# Patient Record
Sex: FEMALE | Race: WHITE
[De-identification: names, ages, dates, MRNs, and addresses within clinical notes are randomized per-mention and may not be internally consistent; named-entity substitution may affect disease eponyms.]

---

## 2017-01-18 ENCOUNTER — HOSPITAL ENCOUNTER (EMERGENCY)
Dept: HOSPITAL 19 - COL.ER | Age: 27
Discharge: HOME | End: 2017-01-18
Payer: MEDICARE

## 2017-01-18 VITALS — WEIGHT: 155.43 LBS | HEIGHT: 64.02 IN | BODY MASS INDEX: 26.53 KG/M2

## 2017-01-18 VITALS — SYSTOLIC BLOOD PRESSURE: 114 MMHG | TEMPERATURE: 97.6 F | HEART RATE: 90 BPM | DIASTOLIC BLOOD PRESSURE: 77 MMHG

## 2017-01-18 DIAGNOSIS — M54.89: ICD-10-CM
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
## 2017-01-18 DIAGNOSIS — S06.0X9A: Primary | ICD-10-CM
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
## 2017-01-18 DIAGNOSIS — M54.2: ICD-10-CM

## 2017-01-18 LAB
ADJUSTED CALCIUM: 9 MG/DL (ref 8.4–10.2)
ALBUMIN SERPL-MCNC: 4 GM/DL (ref 3.5–5)
ALP SERPL-CCNC: 59 U/L (ref 50–136)
ALT SERPL-CCNC: 25 U/L (ref 9–52)
ANION GAP SERPL CALC-SCNC: 9 MMOL/L (ref 7–16)
BASOPHILS # BLD: 0.1 10*3/UL (ref 0–0.2)
BASOPHILS NFR BLD AUTO: 0.7 % (ref 0–2)
BILIRUB SERPL-MCNC: 0.5 MG/DL (ref 0–1)
BUN SERPL-MCNC: 19 MG/DL (ref 7–17)
CALCIUM SERPL-MCNC: 9 MG/DL (ref 8.4–10.2)
CHLORIDE SERPL-SCNC: 108 MMOL/L (ref 98–107)
CO2 SERPL-SCNC: 25 MMOL/L (ref 22–30)
CREAT SERPL-SCNC: 0.73 MG/DL (ref 0.52–1.25)
EOSINOPHIL # BLD: 0.3 10*3/UL (ref 0–0.7)
EOSINOPHIL NFR BLD: 3.9 % (ref 0–4)
ERYTHROCYTE [DISTWIDTH] IN BLOOD BY AUTOMATED COUNT: 12.9 % (ref 11.5–14.5)
GLUCOSE SERPL-MCNC: 81 MG/DL (ref 74–106)
GRANULOCYTES # BLD AUTO: 47.6 % (ref 42.2–75.2)
HCT VFR BLD AUTO: 35.8 % (ref 37–47)
HGB BLD-MCNC: 11.9 G/DL (ref 12.5–16)
LIPASE SERPL-CCNC: 85 U/L (ref 23–300)
LYMPHOCYTES # BLD: 2.9 10*3/UL (ref 1.2–3.4)
LYMPHOCYTES NFR BLD: 38.9 % (ref 20–51)
MCH RBC QN AUTO: 33 PG (ref 27–31)
MCHC RBC AUTO-ENTMCNC: 33 G/DL (ref 33–37)
MCV RBC AUTO: 98 FL (ref 80–100)
MONOCYTES # BLD: 0.7 10*3/UL (ref 0.1–0.6)
MONOCYTES NFR BLD AUTO: 8.9 % (ref 1.7–9.3)
NEUTROPHILS # BLD: 3.5 10*3/UL (ref 1.4–6.5)
PLATELET # BLD AUTO: 277 K/MM3 (ref 130–400)
PMV BLD AUTO: 10 FL (ref 7.4–10.4)
POTASSIUM SERPL-SCNC: 4.5 MMOL/L (ref 3.4–5)
PROT SERPL-MCNC: 7.1 GM/DL (ref 6.4–8.2)
RBC # BLD AUTO: 3.66 M/MM3 (ref 4.1–5.3)
SODIUM SERPL-SCNC: 141 MMOL/L (ref 137–145)
WBC # BLD AUTO: 7.4 K/MM3 (ref 4.8–10.8)

## 2017-01-19 ENCOUNTER — HOSPITAL ENCOUNTER (EMERGENCY)
Dept: HOSPITAL 6 - ED | Age: 27
Discharge: LEFT BEFORE DISCHARGE | End: 2017-01-19
Payer: MEDICARE

## 2017-01-19 VITALS — SYSTOLIC BLOOD PRESSURE: 148 MMHG | DIASTOLIC BLOOD PRESSURE: 91 MMHG

## 2017-01-19 DIAGNOSIS — R52: Primary | ICD-10-CM

## 2017-01-19 DIAGNOSIS — Z91.81: ICD-10-CM

## 2017-01-22 ENCOUNTER — HOSPITAL ENCOUNTER (EMERGENCY)
Dept: HOSPITAL 6 - ED | Age: 27
Discharge: HOME | End: 2017-01-22
Payer: MEDICARE

## 2017-01-22 VITALS — SYSTOLIC BLOOD PRESSURE: 124 MMHG | DIASTOLIC BLOOD PRESSURE: 71 MMHG

## 2017-01-22 DIAGNOSIS — R29.810: Primary | ICD-10-CM

## 2017-01-22 DIAGNOSIS — M25.511: ICD-10-CM

## 2017-01-22 DIAGNOSIS — F41.9: ICD-10-CM

## 2017-01-22 DIAGNOSIS — F17.210: ICD-10-CM

## 2017-01-31 ENCOUNTER — HOSPITAL ENCOUNTER (EMERGENCY)
Dept: HOSPITAL 6 - ED | Age: 27
Discharge: LEFT BEFORE DISCHARGE | End: 2017-01-31
Payer: MEDICARE

## 2017-01-31 ENCOUNTER — HOSPITAL ENCOUNTER (EMERGENCY)
Dept: HOSPITAL 19 - COL.ER | Age: 27
Discharge: LEFT BEFORE DISCHARGE | End: 2017-01-31
Payer: MEDICARE

## 2017-01-31 VITALS — SYSTOLIC BLOOD PRESSURE: 111 MMHG | DIASTOLIC BLOOD PRESSURE: 75 MMHG

## 2017-01-31 VITALS — WEIGHT: 154.32 LBS | HEIGHT: 64.02 IN | BODY MASS INDEX: 26.35 KG/M2

## 2017-01-31 VITALS — DIASTOLIC BLOOD PRESSURE: 76 MMHG | TEMPERATURE: 98.2 F | HEART RATE: 82 BPM | SYSTOLIC BLOOD PRESSURE: 119 MMHG

## 2017-01-31 DIAGNOSIS — F41.9: ICD-10-CM

## 2017-01-31 DIAGNOSIS — R07.89: ICD-10-CM

## 2017-01-31 DIAGNOSIS — Z53.9: Primary | ICD-10-CM

## 2017-01-31 DIAGNOSIS — R06.02: ICD-10-CM

## 2017-01-31 DIAGNOSIS — R04.2: Primary | ICD-10-CM

## 2017-01-31 DIAGNOSIS — R05: ICD-10-CM

## 2017-01-31 DIAGNOSIS — Z91.5: ICD-10-CM

## 2017-01-31 DIAGNOSIS — F17.210: ICD-10-CM

## 2017-02-22 ENCOUNTER — HOSPITAL ENCOUNTER (EMERGENCY)
Dept: HOSPITAL 19 - COL.ER | Age: 27
Discharge: HOME | End: 2017-02-22
Payer: MEDICARE

## 2017-02-22 VITALS — WEIGHT: 149.25 LBS | HEIGHT: 64.02 IN | BODY MASS INDEX: 25.48 KG/M2

## 2017-02-22 VITALS — DIASTOLIC BLOOD PRESSURE: 87 MMHG | TEMPERATURE: 98.1 F | HEART RATE: 93 BPM | SYSTOLIC BLOOD PRESSURE: 129 MMHG

## 2017-02-22 DIAGNOSIS — H10.211: ICD-10-CM

## 2017-02-22 DIAGNOSIS — T52.0X1A: Primary | ICD-10-CM

## 2017-03-07 ENCOUNTER — HOSPITAL ENCOUNTER (EMERGENCY)
Dept: HOSPITAL 19 - COL.ER | Age: 27
Discharge: HOME | End: 2017-03-07
Payer: MEDICARE

## 2017-03-07 VITALS — HEART RATE: 92 BPM | SYSTOLIC BLOOD PRESSURE: 117 MMHG | DIASTOLIC BLOOD PRESSURE: 74 MMHG | TEMPERATURE: 98.1 F

## 2017-03-07 VITALS — WEIGHT: 140.21 LBS | BODY MASS INDEX: 23.94 KG/M2 | HEIGHT: 64.02 IN

## 2017-03-07 DIAGNOSIS — J45.909: ICD-10-CM

## 2017-03-07 DIAGNOSIS — F17.210: ICD-10-CM

## 2017-03-07 DIAGNOSIS — J20.9: Primary | ICD-10-CM

## 2017-04-13 ENCOUNTER — HOSPITAL ENCOUNTER (EMERGENCY)
Dept: HOSPITAL 19 - COL.ER | Age: 27
Discharge: HOME | End: 2017-04-13
Payer: MEDICARE

## 2017-04-13 VITALS — SYSTOLIC BLOOD PRESSURE: 117 MMHG | DIASTOLIC BLOOD PRESSURE: 66 MMHG | HEART RATE: 73 BPM

## 2017-04-13 VITALS — WEIGHT: 150.36 LBS | BODY MASS INDEX: 25.67 KG/M2 | HEIGHT: 64.02 IN

## 2017-04-13 VITALS — TEMPERATURE: 99.1 F

## 2017-04-13 DIAGNOSIS — A08.4: Primary | ICD-10-CM

## 2017-04-13 LAB
ADJUSTED CALCIUM: 9.6 MG/DL (ref 8.4–10.2)
ALBUMIN SERPL-MCNC: 4.4 GM/DL (ref 3.5–5)
ALP SERPL-CCNC: 65 U/L (ref 50–136)
ALT SERPL-CCNC: 15 U/L (ref 9–52)
ANION GAP SERPL CALC-SCNC: 13 MMOL/L (ref 7–16)
BASOPHILS # BLD: 0.1 10*3/UL (ref 0–0.2)
BASOPHILS NFR BLD AUTO: 0.7 % (ref 0–2)
BILIRUB SERPL-MCNC: 0.7 MG/DL (ref 0–1)
BUN SERPL-MCNC: 10 MG/DL (ref 7–17)
CALCIUM SERPL-MCNC: 9.9 MG/DL (ref 8.4–10.2)
CHLORIDE SERPL-SCNC: 103 MMOL/L (ref 98–107)
CO2 SERPL-SCNC: 23 MMOL/L (ref 22–30)
CREAT SERPL-SCNC: 0.63 MG/DL (ref 0.52–1.25)
EOSINOPHIL # BLD: 0.1 10*3/UL (ref 0–0.7)
EOSINOPHIL NFR BLD: 1.6 % (ref 0–4)
ERYTHROCYTE [DISTWIDTH] IN BLOOD BY AUTOMATED COUNT: 12.8 % (ref 11.5–14.5)
GLUCOSE SERPL-MCNC: 99 MG/DL (ref 74–106)
GRANULOCYTES # BLD AUTO: 60.7 % (ref 42.2–75.2)
HCT VFR BLD AUTO: 38.3 % (ref 37–47)
HGB BLD-MCNC: 12.9 G/DL (ref 12.5–16)
HYALINE CASTS #/AREA URNS LPF: (no result) /LPF
KETONES UR STRIP.AUTO-MCNC: (no result) MG/DL
LIPASE SERPL-CCNC: 31 U/L (ref 23–300)
LYMPHOCYTES # BLD: 2.3 10*3/UL (ref 1.2–3.4)
LYMPHOCYTES NFR BLD: 30.6 % (ref 20–51)
MCH RBC QN AUTO: 31 PG (ref 27–31)
MCHC RBC AUTO-ENTMCNC: 34 G/DL (ref 33–37)
MCV RBC AUTO: 93 FL (ref 80–100)
MONOCYTES # BLD: 0.5 10*3/UL (ref 0.1–0.6)
MONOCYTES NFR BLD AUTO: 6.3 % (ref 1.7–9.3)
NEUTROPHILS # BLD: 4.5 10*3/UL (ref 1.4–6.5)
PH UR STRIP.AUTO: 6 [PH] (ref 5–8)
PLATELET # BLD AUTO: 240 K/MM3 (ref 130–400)
PMV BLD AUTO: 11.3 FL (ref 7.4–10.4)
POTASSIUM SERPL-SCNC: 3.7 MMOL/L (ref 3.4–5)
PROT SERPL-MCNC: 7.4 GM/DL (ref 6.4–8.2)
RBC # BLD AUTO: 4.13 M/MM3 (ref 4.1–5.3)
RBC # UR: (no result) /HPF
SODIUM SERPL-SCNC: 139 MMOL/L (ref 137–145)
SP GR UR STRIP.AUTO: 1.02 (ref 1–1.03)
SQUAMOUS # URNS: (no result) /HPF
WBC # BLD AUTO: 7.4 K/MM3 (ref 4.8–10.8)
WBC #/AREA URNS HPF: (no result) /HPF

## 2017-10-05 ENCOUNTER — HOSPITAL ENCOUNTER (INPATIENT)
Dept: HOSPITAL 19 - COL.ER | Age: 27
LOS: 1 days | Discharge: ANOTHER HOSPITAL | DRG: 917 | End: 2017-10-06
Attending: INTERNAL MEDICINE | Admitting: INTERNAL MEDICINE
Payer: MEDICARE

## 2017-10-05 VITALS — OXYGEN SATURATION: 100 %

## 2017-10-05 VITALS — OXYGEN SATURATION: 96 %

## 2017-10-05 VITALS — OXYGEN SATURATION: 95 %

## 2017-10-05 VITALS
HEART RATE: 48 BPM | DIASTOLIC BLOOD PRESSURE: 67 MMHG | SYSTOLIC BLOOD PRESSURE: 105 MMHG | OXYGEN SATURATION: 100 % | TEMPERATURE: 97.6 F

## 2017-10-05 VITALS — OXYGEN SATURATION: 98 %

## 2017-10-05 VITALS — OXYGEN SATURATION: 99 %

## 2017-10-05 VITALS — OXYGEN SATURATION: 97 %

## 2017-10-05 VITALS — OXYGEN SATURATION: 94 %

## 2017-10-05 VITALS
DIASTOLIC BLOOD PRESSURE: 67 MMHG | OXYGEN SATURATION: 98 % | TEMPERATURE: 97.6 F | SYSTOLIC BLOOD PRESSURE: 112 MMHG | HEART RATE: 91 BPM

## 2017-10-05 VITALS
TEMPERATURE: 97.8 F | OXYGEN SATURATION: 100 % | DIASTOLIC BLOOD PRESSURE: 53 MMHG | SYSTOLIC BLOOD PRESSURE: 100 MMHG | HEART RATE: 58 BPM

## 2017-10-05 VITALS
SYSTOLIC BLOOD PRESSURE: 90 MMHG | HEART RATE: 77 BPM | OXYGEN SATURATION: 100 % | TEMPERATURE: 98.7 F | DIASTOLIC BLOOD PRESSURE: 76 MMHG

## 2017-10-05 VITALS — OXYGEN SATURATION: 92 %

## 2017-10-05 VITALS — OXYGEN SATURATION: 84 %

## 2017-10-05 VITALS — OXYGEN SATURATION: 88 %

## 2017-10-05 VITALS — OXYGEN SATURATION: 93 %

## 2017-10-05 VITALS — OXYGEN SATURATION: 66 %

## 2017-10-05 VITALS — BODY MASS INDEX: 23.46 KG/M2 | WEIGHT: 154.76 LBS | HEIGHT: 67.99 IN

## 2017-10-05 VITALS — OXYGEN SATURATION: 78 %

## 2017-10-05 DIAGNOSIS — F32.9: ICD-10-CM

## 2017-10-05 DIAGNOSIS — F11.10: ICD-10-CM

## 2017-10-05 DIAGNOSIS — G40.89: ICD-10-CM

## 2017-10-05 DIAGNOSIS — F17.210: ICD-10-CM

## 2017-10-05 DIAGNOSIS — T42.8X2A: Primary | ICD-10-CM

## 2017-10-05 DIAGNOSIS — F13.10: ICD-10-CM

## 2017-10-05 DIAGNOSIS — F41.1: ICD-10-CM

## 2017-10-05 DIAGNOSIS — J96.01: ICD-10-CM

## 2017-10-05 LAB
ABG VENTILATOR TIDAL VOLUME: 420 ML
ADJUSTED CALCIUM: 8.8 MG/DL (ref 8.4–10.2)
ADJUSTED CALCIUM: 9 MG/DL (ref 8.4–10.2)
ADJUSTED CALCIUM: 9.3 MG/DL (ref 8.4–10.2)
ALBUMIN SERPL-MCNC: 3.1 GM/DL (ref 3.5–5)
ALBUMIN SERPL-MCNC: 3.4 GM/DL (ref 3.5–5)
ALBUMIN SERPL-MCNC: 4 GM/DL (ref 3.5–5)
ALP SERPL-CCNC: 50 U/L (ref 50–136)
ALP SERPL-CCNC: 54 U/L (ref 50–136)
ALP SERPL-CCNC: 70 U/L (ref 50–136)
ALT SERPL-CCNC: 28 U/L (ref 9–52)
ALT SERPL-CCNC: 31 U/L (ref 9–52)
ALT SERPL-CCNC: 33 U/L (ref 9–52)
AMPHET UR-MCNC: NEGATIVE NG/ML
ANION GAP SERPL CALC-SCNC: 10 MMOL/L (ref 7–16)
ANION GAP SERPL CALC-SCNC: 4 MMOL/L (ref 7–16)
ANION GAP SERPL CALC-SCNC: 7 MMOL/L (ref 7–16)
APAP SERPL-MCNC: < 10 UG/ML (ref 10–30)
APTT PPP: 31.8 SECONDS (ref 26–37)
APTT PPP: 31.9 SECONDS (ref 26–37)
ARTERIAL PATENCY WRIST A: (no result)
ARTERIAL PATENCY WRIST A: YES
ATS?: YES
ATS?: YES
BARBITURATES UR SCN-MCNC: NEGATIVE NG/ML
BASE EXCESS BLDA CALC-SCNC: 0.3 MMOL/L (ref -2–2)
BASE EXCESS BLDA CALC-SCNC: 2.4 MMOL/L (ref -2–2)
BASOPHILS # BLD: 0 10*3/UL (ref 0–0.2)
BASOPHILS NFR BLD AUTO: 0.5 % (ref 0–2)
BASOPHILS NFR BLD AUTO: 0.5 % (ref 0–2)
BASOPHILS NFR BLD AUTO: 0.7 % (ref 0–2)
BENZODIAZ UR-MCNC: NEGATIVE NG/ML
BILIRUB SERPL-MCNC: 0.3 MG/DL (ref 0–1)
BILIRUB SERPL-MCNC: 0.3 MG/DL (ref 0–1)
BILIRUB SERPL-MCNC: 0.5 MG/DL (ref 0–1)
BUN SERPL-MCNC: 6 MG/DL (ref 7–17)
BUN SERPL-MCNC: 7 MG/DL (ref 7–17)
BUN SERPL-MCNC: 8 MG/DL (ref 7–17)
BUPRENORPHINE UR-MCNC: NEGATIVE NG/ML
CALCIUM SERPL-MCNC: 8.1 MG/DL (ref 8.4–10.2)
CALCIUM SERPL-MCNC: 8.5 MG/DL (ref 8.4–10.2)
CALCIUM SERPL-MCNC: 9.3 MG/DL (ref 8.4–10.2)
CHLORIDE SERPL-SCNC: 108 MMOL/L (ref 98–107)
CHLORIDE SERPL-SCNC: 111 MMOL/L (ref 98–107)
CHLORIDE SERPL-SCNC: 112 MMOL/L (ref 98–107)
CK SERPL-CCNC: 45 U/L (ref 30–135)
CK SERPL-CCNC: 54 U/L (ref 30–135)
CO2 BLDA-SCNC: 26.1 MMOL/L
CO2 BLDA-SCNC: 28.1 MMOL/L
CO2 SERPL-SCNC: 25 MMOL/L (ref 22–30)
CO2 SERPL-SCNC: 26 MMOL/L (ref 22–30)
CO2 SERPL-SCNC: 26 MMOL/L (ref 22–30)
CREAT SERPL-SCNC: 0.57 MG/DL (ref 0.52–1.25)
CREAT SERPL-SCNC: 0.57 MG/DL (ref 0.52–1.25)
CREAT SERPL-SCNC: 0.64 MG/DL (ref 0.52–1.25)
DIGOXIN SERPL-MCNC: 26.5 NG/ML (ref 3–18.6)
EOSINOPHIL # BLD: 0.2 10*3/UL (ref 0–0.7)
EOSINOPHIL # BLD: 0.2 10*3/UL (ref 0–0.7)
EOSINOPHIL # BLD: 0.3 10*3/UL (ref 0–0.7)
EOSINOPHIL NFR BLD: 2.5 % (ref 0–4)
EOSINOPHIL NFR BLD: 3.8 % (ref 0–4)
EOSINOPHIL NFR BLD: 4.1 % (ref 0–4)
ERYTHROCYTE [DISTWIDTH] IN BLOOD BY AUTOMATED COUNT: 12.9 % (ref 11.5–14.5)
ERYTHROCYTE [DISTWIDTH] IN BLOOD BY AUTOMATED COUNT: 13.2 % (ref 11.5–14.5)
ERYTHROCYTE [DISTWIDTH] IN BLOOD BY AUTOMATED COUNT: 13.2 % (ref 11.5–14.5)
GLUCOSE SERPL-MCNC: 137 MG/DL (ref 74–106)
GLUCOSE SERPL-MCNC: 79 MG/DL (ref 74–106)
GLUCOSE SERPL-MCNC: 89 MG/DL (ref 74–106)
GRANULOCYTES # BLD AUTO: 42.2 % (ref 42.2–75.2)
GRANULOCYTES # BLD AUTO: 53.7 % (ref 42.2–75.2)
GRANULOCYTES # BLD AUTO: 62.4 % (ref 42.2–75.2)
HCO3 BLDA-SCNC: 24.9 MEQ/L (ref 22–26)
HCO3 BLDA-SCNC: 26.8 MEQ/L (ref 22–26)
HCT VFR BLD AUTO: 31.6 % (ref 37–47)
HCT VFR BLD AUTO: 33.3 % (ref 37–47)
HCT VFR BLD AUTO: 35.8 % (ref 37–47)
HGB BLD-MCNC: 10.4 G/DL (ref 12.5–16)
HGB BLD-MCNC: 11 G/DL (ref 12.5–16)
HGB BLD-MCNC: 12 G/DL (ref 12.5–16)
INHALED O2 CONCENTRATION: 21 %
INHALED O2 CONCENTRATION: 60 %
INR BLD: 1.1 (ref 0.8–3)
INR BLD: 1.1 (ref 0.8–3)
LIPASE SERPL-CCNC: 66 U/L (ref 23–300)
LYMPHOCYTES # BLD: 2.4 10*3/UL (ref 1.2–3.4)
LYMPHOCYTES # BLD: 2.4 10*3/UL (ref 1.2–3.4)
LYMPHOCYTES # BLD: 2.7 10*3/UL (ref 1.2–3.4)
LYMPHOCYTES NFR BLD: 27.5 % (ref 20–51)
LYMPHOCYTES NFR BLD: 33 % (ref 20–51)
LYMPHOCYTES NFR BLD: 44.7 % (ref 20–51)
MAGNESIUM SERPL-MCNC: 1.8 MG/DL (ref 1.6–2.3)
MAGNESIUM SERPL-MCNC: 1.9 MG/DL (ref 1.6–2.3)
MCH RBC QN AUTO: 33 PG (ref 27–31)
MCHC RBC AUTO-ENTMCNC: 33 G/DL (ref 33–37)
MCHC RBC AUTO-ENTMCNC: 33 G/DL (ref 33–37)
MCHC RBC AUTO-ENTMCNC: 34 G/DL (ref 33–37)
MCV RBC AUTO: 101 FL (ref 80–100)
MCV RBC AUTO: 101 FL (ref 80–100)
MCV RBC AUTO: 99 FL (ref 80–100)
METHADONE UR-MCNC: NEGATIVE NG/ML
MONOCYTES # BLD: 0.4 10*3/UL (ref 0.1–0.6)
MONOCYTES # BLD: 0.6 10*3/UL (ref 0.1–0.6)
MONOCYTES # BLD: 0.7 10*3/UL (ref 0.1–0.6)
MONOCYTES NFR BLD AUTO: 7 % (ref 1.7–9.3)
MONOCYTES NFR BLD AUTO: 8.1 % (ref 1.7–9.3)
MONOCYTES NFR BLD AUTO: 8.6 % (ref 1.7–9.3)
NEUTROPHILS # BLD: 2.3 10*3/UL (ref 1.4–6.5)
NEUTROPHILS # BLD: 4.4 10*3/UL (ref 1.4–6.5)
NEUTROPHILS # BLD: 5.5 10*3/UL (ref 1.4–6.5)
OPIATES UR-MCNC: POSITIVE NG/ML
OXYCODONE UR CFM-MCNC: NEGATIVE NG/ML
OXYHGB MFR BLD: 94.2 %
OXYHGB MFR BLD: 96.9 %
PCP UR-MCNC: NEGATIVE NG/ML
PH UR STRIP.AUTO: 6 [PH] (ref 5–8)
PHOSPHATE SERPL-MCNC: 2.7 MG/DL (ref 2.5–4.5)
PHOSPHATE SERPL-MCNC: 2.7 MG/DL (ref 2.5–4.5)
PLATELET # BLD AUTO: 222 K/MM3 (ref 130–400)
PLATELET # BLD AUTO: 228 K/MM3 (ref 130–400)
PLATELET # BLD AUTO: 263 K/MM3 (ref 130–400)
PMV BLD AUTO: 10.5 FL (ref 7.4–10.4)
PMV BLD AUTO: 10.6 FL (ref 7.4–10.4)
PMV BLD AUTO: 10.6 FL (ref 7.4–10.4)
PO2 BLDA: 149.8 MMHG (ref 80–100)
PO2 BLDA: 79.1 MMHG (ref 80–100)
POTASSIUM SERPL-SCNC: 3.6 MMOL/L (ref 3.4–5)
POTASSIUM SERPL-SCNC: 3.7 MMOL/L (ref 3.4–5)
POTASSIUM SERPL-SCNC: 4.2 MMOL/L (ref 3.4–5)
PROPOXYPH UR-MCNC: NEGATIVE NG/ML
PROT SERPL-MCNC: 5.7 GM/DL (ref 6.4–8.2)
PROT SERPL-MCNC: 6.1 GM/DL (ref 6.4–8.2)
PROT SERPL-MCNC: 7 GM/DL (ref 6.4–8.2)
PROTHROMBIN TIME: 11.7 SECONDS (ref 9.7–12.8)
PROTHROMBIN TIME: 11.8 SECONDS (ref 9.7–12.8)
RBC # BLD AUTO: 3.14 M/MM3 (ref 4.1–5.3)
RBC # BLD AUTO: 3.29 M/MM3 (ref 4.1–5.3)
RBC # BLD AUTO: 3.61 M/MM3 (ref 4.1–5.3)
RBC # UR STRIP.AUTO: (no result) /UL
RBC # UR: (no result) /HPF
SALICYLATES SERPL-MCNC: < 1 MG/DL
SAO2 % BLDA: 95.1 % (ref 92–100)
SAO2 % BLDA: 98.3 % (ref 92–100)
SODIUM SERPL-SCNC: 140 MMOL/L (ref 137–145)
SODIUM SERPL-SCNC: 143 MMOL/L (ref 137–145)
SODIUM SERPL-SCNC: 144 MMOL/L (ref 137–145)
SP GR UR STRIP.AUTO: 1 (ref 1–1.03)
SQUAMOUS # URNS: (no result) /HPF
THC CANNABINOIDS URINE: NEGATIVE NG/ML
TROPONIN I SERPL-MCNC: < 0.012 NG/ML (ref 0–0.03)
WBC # BLD AUTO: 5.4 K/MM3 (ref 4.8–10.8)
WBC # BLD AUTO: 8.3 K/MM3 (ref 4.8–10.8)
WBC # BLD AUTO: 8.8 K/MM3 (ref 4.8–10.8)
WBC #/AREA URNS HPF: (no result) /HPF

## 2017-10-05 PROCEDURE — C1894 INTRO/SHEATH, NON-LASER: HCPCS

## 2017-10-05 PROCEDURE — C1751 CATH, INF, PER/CENT/MIDLINE: HCPCS

## 2017-10-05 PROCEDURE — 5A1935Z RESPIRATORY VENTILATION, LESS THAN 24 CONSECUTIVE HOURS: ICD-10-PCS | Performed by: INTERNAL MEDICINE

## 2017-10-05 PROCEDURE — 0BH18EZ INSERTION OF ENDOTRACHEAL AIRWAY INTO TRACHEA, VIA NATURAL OR ARTIFICIAL OPENING ENDOSCOPIC: ICD-10-PCS | Performed by: INTERNAL MEDICINE

## 2017-10-06 VITALS — OXYGEN SATURATION: 98 %

## 2017-10-06 VITALS — OXYGEN SATURATION: 96 %

## 2017-10-06 VITALS — OXYGEN SATURATION: 99 %

## 2017-10-06 VITALS — OXYGEN SATURATION: 100 %

## 2017-10-06 VITALS — OXYGEN SATURATION: 97 %

## 2017-10-06 VITALS — OXYGEN SATURATION: 74 %

## 2017-10-06 VITALS
TEMPERATURE: 98 F | HEART RATE: 72 BPM | SYSTOLIC BLOOD PRESSURE: 125 MMHG | DIASTOLIC BLOOD PRESSURE: 81 MMHG | OXYGEN SATURATION: 99 %

## 2017-10-06 VITALS
DIASTOLIC BLOOD PRESSURE: 87 MMHG | SYSTOLIC BLOOD PRESSURE: 127 MMHG | TEMPERATURE: 98.2 F | HEART RATE: 62 BPM | OXYGEN SATURATION: 98 %

## 2017-10-06 VITALS
OXYGEN SATURATION: 96 % | TEMPERATURE: 97.1 F | HEART RATE: 75 BPM | DIASTOLIC BLOOD PRESSURE: 64 MMHG | SYSTOLIC BLOOD PRESSURE: 112 MMHG

## 2017-10-06 VITALS — SYSTOLIC BLOOD PRESSURE: 110 MMHG | DIASTOLIC BLOOD PRESSURE: 73 MMHG | HEART RATE: 110 BPM | TEMPERATURE: 98.1 F

## 2017-10-06 VITALS — OXYGEN SATURATION: 94 %

## 2017-10-06 VITALS — OXYGEN SATURATION: 71 %

## 2017-10-06 VITALS — OXYGEN SATURATION: 96 % | HEART RATE: 63 BPM | TEMPERATURE: 97.5 F

## 2017-10-06 VITALS — OXYGEN SATURATION: 91 %

## 2017-10-06 VITALS — OXYGEN SATURATION: 92 %

## 2017-10-06 VITALS — OXYGEN SATURATION: 93 %

## 2017-10-06 VITALS — OXYGEN SATURATION: 84 %

## 2017-10-06 VITALS — OXYGEN SATURATION: 86 %

## 2017-10-06 VITALS — OXYGEN SATURATION: 89 %

## 2017-10-06 VITALS — OXYGEN SATURATION: 77 %

## 2017-10-06 VITALS — OXYGEN SATURATION: 76 %

## 2017-10-06 VITALS — OXYGEN SATURATION: 95 %

## 2017-10-06 VITALS — OXYGEN SATURATION: 75 %

## 2017-10-06 VITALS — OXYGEN SATURATION: 88 %

## 2017-10-06 VITALS — OXYGEN SATURATION: 83 %

## 2017-10-06 VITALS — OXYGEN SATURATION: 80 %

## 2017-10-06 VITALS — OXYGEN SATURATION: 64 %

## 2017-10-06 VITALS — OXYGEN SATURATION: 72 %

## 2017-10-06 LAB
ADJUSTED CALCIUM: 8.9 MG/DL (ref 8.4–10.2)
ALBUMIN SERPL-MCNC: 3.1 GM/DL (ref 3.5–5)
ALP SERPL-CCNC: 47 U/L (ref 50–136)
ALT SERPL-CCNC: 28 U/L (ref 9–52)
ANION GAP SERPL CALC-SCNC: 5 MMOL/L (ref 7–16)
BASOPHILS # BLD: 0.1 10*3/UL (ref 0–0.2)
BASOPHILS NFR BLD AUTO: 0.7 % (ref 0–2)
BILIRUB SERPL-MCNC: 0.5 MG/DL (ref 0–1)
BUN SERPL-MCNC: 6 MG/DL (ref 7–17)
CALCIUM SERPL-MCNC: 8.2 MG/DL (ref 8.4–10.2)
CHLORIDE SERPL-SCNC: 111 MMOL/L (ref 98–107)
CK SERPL-CCNC: 49 U/L (ref 30–135)
CO2 SERPL-SCNC: 24 MMOL/L (ref 22–30)
CREAT SERPL-SCNC: 0.58 MG/DL (ref 0.52–1.25)
EOSINOPHIL # BLD: 0.2 10*3/UL (ref 0–0.7)
EOSINOPHIL NFR BLD: 2.7 % (ref 0–4)
ERYTHROCYTE [DISTWIDTH] IN BLOOD BY AUTOMATED COUNT: 13.2 % (ref 11.5–14.5)
GLUCOSE SERPL-MCNC: 90 MG/DL (ref 74–106)
GRANULOCYTES # BLD AUTO: 55.6 % (ref 42.2–75.2)
HCT VFR BLD AUTO: 31.4 % (ref 37–47)
HGB BLD-MCNC: 10.5 G/DL (ref 12.5–16)
LYMPHOCYTES # BLD: 2.3 10*3/UL (ref 1.2–3.4)
LYMPHOCYTES NFR BLD: 32.4 % (ref 20–51)
MAGNESIUM SERPL-MCNC: 1.7 MG/DL (ref 1.6–2.3)
MCH RBC QN AUTO: 34 PG (ref 27–31)
MCHC RBC AUTO-ENTMCNC: 33 G/DL (ref 33–37)
MCV RBC AUTO: 102 FL (ref 80–100)
MONOCYTES # BLD: 0.6 10*3/UL (ref 0.1–0.6)
MONOCYTES NFR BLD AUTO: 8.5 % (ref 1.7–9.3)
NEUTROPHILS # BLD: 3.9 10*3/UL (ref 1.4–6.5)
PHOSPHATE SERPL-MCNC: 3.3 MG/DL (ref 2.5–4.5)
PLATELET # BLD AUTO: 202 K/MM3 (ref 130–400)
PMV BLD AUTO: 10.5 FL (ref 7.4–10.4)
POTASSIUM SERPL-SCNC: 3.8 MMOL/L (ref 3.4–5)
PROT SERPL-MCNC: 5.7 GM/DL (ref 6.4–8.2)
RBC # BLD AUTO: 3.08 M/MM3 (ref 4.1–5.3)
SODIUM SERPL-SCNC: 139 MMOL/L (ref 137–145)
WBC # BLD AUTO: 7 K/MM3 (ref 4.8–10.8)

## 2017-10-11 ENCOUNTER — HOSPITAL ENCOUNTER (EMERGENCY)
Dept: HOSPITAL 19 - COL.ER | Age: 27
Discharge: ANOTHER HOSPITAL | End: 2017-10-11
Payer: MEDICARE

## 2017-10-11 VITALS — HEIGHT: 65 IN | WEIGHT: 149.25 LBS | BODY MASS INDEX: 24.87 KG/M2

## 2017-10-11 VITALS — TEMPERATURE: 96.6 F

## 2017-10-11 VITALS — DIASTOLIC BLOOD PRESSURE: 72 MMHG | HEART RATE: 49 BPM | SYSTOLIC BLOOD PRESSURE: 100 MMHG

## 2017-10-11 DIAGNOSIS — F32.9: ICD-10-CM

## 2017-10-11 DIAGNOSIS — T50.901A: ICD-10-CM

## 2017-10-11 DIAGNOSIS — F17.210: ICD-10-CM

## 2017-10-11 DIAGNOSIS — R41.82: ICD-10-CM

## 2017-10-11 DIAGNOSIS — J96.90: Primary | ICD-10-CM

## 2017-10-11 DIAGNOSIS — F41.9: ICD-10-CM

## 2017-10-11 DIAGNOSIS — I95.9: ICD-10-CM

## 2017-10-11 LAB
ABG VENTILATOR TIDAL VOLUME: 500 ML
ADJUSTED CALCIUM: 9 MG/DL (ref 8.4–10.2)
ALBUMIN SERPL-MCNC: 4.1 GM/DL (ref 3.5–5)
ALP SERPL-CCNC: 61 U/L (ref 50–136)
ALT SERPL-CCNC: 23 U/L (ref 9–52)
AMPHET UR-MCNC: NEGATIVE NG/ML
ANION GAP SERPL CALC-SCNC: 9 MMOL/L (ref 7–16)
APAP SERPL-MCNC: < 10 UG/ML (ref 10–30)
ARTERIAL PATENCY WRIST A: (no result)
ARTERIAL PATENCY WRIST A: (no result)
ARTERIAL PATENCY WRIST A: YES
ARTERIAL PATENCY WRIST A: YES
ATS?: YES
ATS?: YES
BARBITURATES UR SCN-MCNC: NEGATIVE NG/ML
BASE EXCESS BLDA CALC-SCNC: -2.2 MMOL/L (ref -2–2)
BASE EXCESS BLDA CALC-SCNC: -3.6 MMOL/L (ref -2–2)
BASOPHILS # BLD: 0.1 10*3/UL (ref 0–0.2)
BASOPHILS NFR BLD AUTO: 0.7 % (ref 0–2)
BENZODIAZ UR-MCNC: POSITIVE NG/ML
BILIRUB SERPL-MCNC: 0.5 MG/DL (ref 0–1)
BUN SERPL-MCNC: 7 MG/DL (ref 7–17)
BUPRENORPHINE UR-MCNC: NEGATIVE NG/ML
CALCIUM SERPL-MCNC: 9.1 MG/DL (ref 8.4–10.2)
CHLORIDE SERPL-SCNC: 106 MMOL/L (ref 98–107)
CO2 BLDA-SCNC: 22.4 MMOL/L
CO2 BLDA-SCNC: 25.1 MMOL/L
CO2 SERPL-SCNC: 23 MMOL/L (ref 22–30)
CREAT SERPL-SCNC: 0.7 MG/DL (ref 0.52–1.25)
EOSINOPHIL # BLD: 0.3 10*3/UL (ref 0–0.7)
EOSINOPHIL NFR BLD: 3.6 % (ref 0–4)
ERYTHROCYTE [DISTWIDTH] IN BLOOD BY AUTOMATED COUNT: 12.7 % (ref 11.5–14.5)
GLUCOSE SERPL-MCNC: 82 MG/DL (ref 74–106)
GRANULOCYTES # BLD AUTO: 43.3 % (ref 42.2–75.2)
HCO3 BLDA-SCNC: 21.4 MEQ/L (ref 22–26)
HCO3 BLDA-SCNC: 23.6 MEQ/L (ref 22–26)
HCT VFR BLD AUTO: 34.2 % (ref 37–47)
HGB BLD-MCNC: 11.6 G/DL (ref 12.5–16)
INHALED O2 CONCENTRATION: 21 %
INHALED O2 CONCENTRATION: 40 %
LYMPHOCYTES # BLD: 3.1 10*3/UL (ref 1.2–3.4)
LYMPHOCYTES NFR BLD: 43.2 % (ref 20–51)
MCH RBC QN AUTO: 34 PG (ref 27–31)
MCHC RBC AUTO-ENTMCNC: 34 G/DL (ref 33–37)
MCV RBC AUTO: 99 FL (ref 80–100)
METHADONE UR-MCNC: NEGATIVE NG/ML
MONOCYTES # BLD: 0.7 10*3/UL (ref 0.1–0.6)
MONOCYTES NFR BLD AUTO: 9.1 % (ref 1.7–9.3)
NEUTROPHILS # BLD: 3.1 10*3/UL (ref 1.4–6.5)
OPIATES UR-MCNC: POSITIVE NG/ML
OXYCODONE UR CFM-MCNC: NEGATIVE NG/ML
OXYHGB MFR BLD: 93.4 %
OXYHGB MFR BLD: 97.5 %
PCP UR-MCNC: NEGATIVE NG/ML
PH UR STRIP.AUTO: 7 [PH] (ref 5–8)
PLATELET # BLD AUTO: 233 K/MM3 (ref 130–400)
PMV BLD AUTO: 10.4 FL (ref 7.4–10.4)
PO2 BLDA: 155.7 MMHG (ref 80–100)
PO2 BLDA: 83.9 MMHG (ref 80–100)
POTASSIUM SERPL-SCNC: 3.5 MMOL/L (ref 3.4–5)
PROPOXYPH UR-MCNC: NEGATIVE NG/ML
PROT SERPL-MCNC: 7 GM/DL (ref 6.4–8.2)
RBC # BLD AUTO: 3.45 M/MM3 (ref 4.1–5.3)
RBC # UR: (no result) /HPF
SALICYLATES SERPL-MCNC: < 1 MG/DL
SAO2 % BLDA: 94.6 % (ref 92–100)
SAO2 % BLDA: 98.5 % (ref 92–100)
SODIUM SERPL-SCNC: 138 MMOL/L (ref 137–145)
SP GR UR STRIP.AUTO: 1 (ref 1–1.03)
SQUAMOUS # URNS: (no result) /HPF
THC CANNABINOIDS URINE: NEGATIVE NG/ML
WBC # BLD AUTO: 7.2 K/MM3 (ref 4.8–10.8)
WBC #/AREA URNS HPF: (no result) /HPF

## 2017-10-14 ENCOUNTER — HOSPITAL ENCOUNTER (EMERGENCY)
Dept: HOSPITAL 6 - ED | Age: 27
Discharge: HOME | End: 2017-10-14
Payer: MEDICARE

## 2017-10-14 VITALS — BODY MASS INDEX: 25.67 KG/M2 | WEIGHT: 150.36 LBS | HEIGHT: 64.02 IN

## 2017-10-14 VITALS — DIASTOLIC BLOOD PRESSURE: 67 MMHG | SYSTOLIC BLOOD PRESSURE: 100 MMHG

## 2017-10-14 DIAGNOSIS — G89.29: ICD-10-CM

## 2017-10-14 DIAGNOSIS — R33.9: ICD-10-CM

## 2017-10-14 DIAGNOSIS — R10.84: ICD-10-CM

## 2017-10-14 DIAGNOSIS — E87.6: Primary | ICD-10-CM

## 2017-10-14 DIAGNOSIS — R30.0: ICD-10-CM

## 2017-10-14 DIAGNOSIS — G47.00: ICD-10-CM

## 2017-10-14 DIAGNOSIS — E16.2: ICD-10-CM

## 2017-10-14 DIAGNOSIS — F41.9: ICD-10-CM

## 2017-10-14 DIAGNOSIS — M54.9: ICD-10-CM

## 2017-10-19 ENCOUNTER — HOSPITAL ENCOUNTER (EMERGENCY)
Dept: HOSPITAL 19 - COL.ER | Age: 27
Discharge: ANOTHER HOSPITAL | End: 2017-10-19
Payer: MEDICARE

## 2017-10-19 VITALS — HEIGHT: 65.98 IN | WEIGHT: 160.28 LBS | BODY MASS INDEX: 25.76 KG/M2

## 2017-10-19 VITALS — SYSTOLIC BLOOD PRESSURE: 92 MMHG | DIASTOLIC BLOOD PRESSURE: 51 MMHG | HEART RATE: 52 BPM

## 2017-10-19 VITALS — TEMPERATURE: 97.7 F

## 2017-10-19 DIAGNOSIS — R41.82: Primary | ICD-10-CM

## 2017-10-19 DIAGNOSIS — F43.10: ICD-10-CM

## 2017-10-19 LAB
ADJUSTED CALCIUM: 9.3 MG/DL (ref 8.4–10.2)
ALBUMIN SERPL-MCNC: 3.8 GM/DL (ref 3.5–5)
ALP SERPL-CCNC: 78 U/L (ref 50–136)
ALT SERPL-CCNC: 19 U/L (ref 9–52)
AMPHET UR-MCNC: NEGATIVE NG/ML
ANION GAP SERPL CALC-SCNC: 14 MMOL/L (ref 7–16)
APAP SERPL-MCNC: < 10 UG/ML (ref 10–30)
APTT PPP: 32.3 SECONDS (ref 26–37)
ARTERIAL PATENCY WRIST A: (no result)
ARTERIAL PATENCY WRIST A: YES
ATS?: YES
BARBITURATES UR SCN-MCNC: NEGATIVE NG/ML
BASE EXCESS BLDA CALC-SCNC: -7.5 MMOL/L (ref -2–2)
BASOPHILS # BLD: 0.1 10*3/UL (ref 0–0.2)
BASOPHILS NFR BLD AUTO: 0.8 % (ref 0–2)
BENZODIAZ UR-MCNC: NEGATIVE NG/ML
BILIRUB SERPL-MCNC: 0.3 MG/DL (ref 0–1)
BUN SERPL-MCNC: 4 MG/DL (ref 7–17)
BUPRENORPHINE UR-MCNC: NEGATIVE NG/ML
CALCIUM SERPL-MCNC: 9.1 MG/DL (ref 8.4–10.2)
CHLORIDE SERPL-SCNC: 110 MMOL/L (ref 98–107)
CO2 BLDA-SCNC: 18.6 MMOL/L
CO2 SERPL-SCNC: 20 MMOL/L (ref 22–30)
CREAT SERPL-SCNC: 0.68 MG/DL (ref 0.52–1.25)
DRUGS UR SCN NOM: NEGATIVE NG/ML
EOSINOPHIL # BLD: 0.4 10*3/UL (ref 0–0.7)
EOSINOPHIL NFR BLD: 5.2 % (ref 0–4)
ETHANOL SPEC-SCNC: 48 MG/DL
GLUCOSE SERPL-MCNC: 99 MG/DL (ref 74–106)
GRANULOCYTES # BLD AUTO: 46.4 % (ref 42.2–75.2)
HCO3 BLDA-SCNC: 17.6 MEQ/L (ref 22–26)
HCT VFR BLD AUTO: 34.7 % (ref 37–47)
HGB BLD-MCNC: 11.9 G/DL (ref 12.5–16)
INHALED O2 CONCENTRATION: 21 %
INR BLD: 0.9 (ref 0.8–3)
LYMPHOCYTES # BLD: 3 10*3/UL (ref 1.2–3.4)
LYMPHOCYTES NFR BLD: 38.2 % (ref 20–51)
MAGNESIUM SERPL-MCNC: 1.9 MG/DL (ref 1.6–2.3)
MCH RBC QN AUTO: 34 PG (ref 27–31)
MCHC RBC AUTO-ENTMCNC: 34 G/DL (ref 33–37)
MCV RBC AUTO: 98 FL (ref 80–100)
METHADONE UR-MCNC: NEGATIVE NG/ML
MONOCYTES # BLD: 0.7 10*3/UL (ref 0.1–0.6)
MONOCYTES NFR BLD AUTO: 9.1 % (ref 1.7–9.3)
NEUTROPHILS # BLD: 3.7 10*3/UL (ref 1.4–6.5)
OPIATES UR-MCNC: NEGATIVE NG/ML
OXYCODONE UR CFM-MCNC: NEGATIVE NG/ML
OXYHGB MFR BLD: 94.4 %
PCP UR-MCNC: NEGATIVE NG/ML
PHOSPHATE SERPL-MCNC: 4.4 MG/DL (ref 2.5–4.5)
PLATELET # BLD AUTO: 245 K/MM3 (ref 130–400)
PMV BLD AUTO: 10.8 FL (ref 7.4–10.4)
PO2 BLDA: 87.3 MMHG (ref 80–100)
POTASSIUM SERPL-SCNC: 3.6 MMOL/L (ref 3.4–5)
PROPOXYPH UR-MCNC: NEGATIVE NG/ML
PROT SERPL-MCNC: 6.9 GM/DL (ref 6.4–8.2)
PROTHROMBIN TIME: 9.6 SECONDS (ref 9.7–12.8)
RBC # BLD AUTO: 3.55 M/MM3 (ref 4.1–5.3)
SALICYLATES SERPL-MCNC: < 1 MG/DL
SAO2 % BLDA: 95.4 % (ref 92–100)
SODIUM SERPL-SCNC: 144 MMOL/L (ref 137–145)
THC CANNABINOIDS URINE: NEGATIVE NG/ML
WBC # BLD AUTO: 7.9 K/MM3 (ref 4.8–10.8)

## 2018-02-10 ENCOUNTER — HOSPITAL ENCOUNTER (EMERGENCY)
Dept: HOSPITAL 6 - ED | Age: 28
Discharge: LEFT BEFORE DISCHARGE | End: 2018-02-10
Payer: MEDICARE

## 2018-02-10 VITALS — DIASTOLIC BLOOD PRESSURE: 63 MMHG | SYSTOLIC BLOOD PRESSURE: 100 MMHG

## 2018-02-10 DIAGNOSIS — R55: ICD-10-CM

## 2018-02-10 DIAGNOSIS — Z88.5: ICD-10-CM

## 2018-02-10 DIAGNOSIS — F17.200: ICD-10-CM

## 2018-02-10 DIAGNOSIS — F99: ICD-10-CM

## 2018-02-10 DIAGNOSIS — Z53.21: ICD-10-CM

## 2018-02-10 DIAGNOSIS — Z88.8: ICD-10-CM

## 2018-02-10 DIAGNOSIS — Z88.1: ICD-10-CM

## 2018-02-10 DIAGNOSIS — R07.9: Primary | ICD-10-CM

## 2018-02-10 DIAGNOSIS — Z88.2: ICD-10-CM

## 2018-04-07 ENCOUNTER — HOSPITAL ENCOUNTER (EMERGENCY)
Dept: HOSPITAL 6 - ED | Age: 28
Discharge: HOME | End: 2018-04-07
Payer: MEDICARE

## 2018-04-07 VITALS — DIASTOLIC BLOOD PRESSURE: 74 MMHG | SYSTOLIC BLOOD PRESSURE: 120 MMHG

## 2018-04-07 VITALS — BODY MASS INDEX: 29.92 KG/M2 | WEIGHT: 175.27 LBS | HEIGHT: 64.02 IN

## 2018-04-07 DIAGNOSIS — R05: ICD-10-CM

## 2018-04-07 DIAGNOSIS — R10.12: ICD-10-CM

## 2018-04-07 DIAGNOSIS — R07.1: ICD-10-CM

## 2018-04-07 DIAGNOSIS — R07.89: Primary | ICD-10-CM

## 2018-04-10 ENCOUNTER — HOSPITAL ENCOUNTER (OUTPATIENT)
Dept: HOSPITAL 19 - ZLAB.WCH | Age: 28
End: 2018-04-10

## 2018-04-10 ENCOUNTER — HOSPITAL ENCOUNTER (EMERGENCY)
Dept: HOSPITAL 6 - ED | Age: 28
Discharge: HOME | End: 2018-04-10
Payer: MEDICARE

## 2018-04-10 VITALS — HEIGHT: 64 IN | BODY MASS INDEX: 29.92 KG/M2 | WEIGHT: 175.27 LBS

## 2018-04-10 VITALS — DIASTOLIC BLOOD PRESSURE: 70 MMHG | SYSTOLIC BLOOD PRESSURE: 127 MMHG

## 2018-04-10 DIAGNOSIS — M26.609: ICD-10-CM

## 2018-04-10 DIAGNOSIS — Z88.5: ICD-10-CM

## 2018-04-10 DIAGNOSIS — R07.9: Primary | ICD-10-CM

## 2018-04-10 DIAGNOSIS — Z88.1: ICD-10-CM

## 2018-04-10 DIAGNOSIS — Z88.2: ICD-10-CM

## 2018-04-10 DIAGNOSIS — R53.1: ICD-10-CM

## 2018-04-10 DIAGNOSIS — F17.200: ICD-10-CM

## 2018-04-10 DIAGNOSIS — R06.02: ICD-10-CM

## 2018-04-10 DIAGNOSIS — Z88.8: ICD-10-CM

## 2018-04-10 DIAGNOSIS — R07.1: ICD-10-CM

## 2018-04-10 DIAGNOSIS — Z01.89: Primary | ICD-10-CM

## 2018-04-10 DIAGNOSIS — R00.1: ICD-10-CM

## 2018-04-10 DIAGNOSIS — R53.83: ICD-10-CM

## 2018-04-10 LAB
ALBUMIN SERPL-MCNC: 3.9 G/DL (ref 3.5–5)
ALT SERPL-CCNC: 28 U/L (ref 9–52)
APPEARANCE UR: CLEAR
AST SERPL-CCNC: 23 U/L (ref 14–36)
BASOPHILS # BLD: 0.1 10*3/UL (ref 0.02–0.1)
BILIRUB SERPL-MCNC: 0.2 MG/DL (ref 0.2–1.3)
BILIRUB UR QL STRIP.AUTO: NEGATIVE
BUN/CREAT SERPL: 14.1 (ref 6–26)
CALCIUM SERPL-MCNC: 8.6 MG/DL (ref 8.4–10.2)
CO2 SERPL-SCNC: 23 MMOL/L (ref 22–30)
COLOR UR AUTO: YELLOW
EOSINOPHIL # BLD: 0.3 10*3/UL (ref 0.04–0.4)
EOSINOPHIL NFR BLD: 3.1 % (ref 1–5)
ERYTHROCYTE [DISTWIDTH] IN BLOOD BY AUTOMATED COUNT: 13.3 % (ref 11.5–14.5)
GLUCOSE SERPL-MCNC: 86 MG/DL (ref 65–105)
GLUCOSE UR QL STRIP.AUTO: NEGATIVE MG/DL
HCT VFR BLD AUTO: 38.8 % (ref 37–47)
HGB BLD-MCNC: 13 G/DL (ref 12.5–16)
KETONES UR QL STRIP.AUTO: NEGATIVE
LEUKOCYTE ESTERASE UR QL STRIP: NEGATIVE
LYMPHOCYTES # BLD: 4.3 10*3/UL (ref 1.5–4)
MCH RBC QN AUTO: 32 PG (ref 27–31)
MCHC RBC AUTO-ENTMCNC: 34 G/DL (ref 33–37)
MCV RBC AUTO: 97 FL (ref 78–100)
MONOCYTES # BLD: 0.6 10*3/UL (ref 0.2–0.8)
NEUTROPHILS # BLD: 3.2 10*3/UL (ref 1.4–6.5)
NITRITE UR QL STRIP: NEGATIVE
PH UR STRIP.AUTO: 5 [PH] (ref 5–8)
PLATELET # BLD AUTO: 227 K/MM3 (ref 130–400)
PMV BLD AUTO: 9.9 FL (ref 7.4–10.4)
POTASSIUM SERPL-SCNC: 3.6 MMOL/L (ref 3.6–5)
PROT ?TM UR-MCNC: NEGATIVE MG/DL
PROT SERPL-MCNC: 7.4 G/DL (ref 6.3–8.2)
RBC # BLD AUTO: 4.01 M/MM3 (ref 4.1–5.3)
RBC UR QL AUTO: NEGATIVE
SODIUM SERPL-SCNC: 138 MMOL/L (ref 137–145)
SP GR UR STRIP.AUTO: 1.01 (ref 1–1.03)
UROBILINOGEN UR-MCNC: NORMAL MG/DL
WBC # BLD AUTO: 8.5 K/MM3 (ref 4.8–10.8)
WBC #/AREA URNS HPF: (no result) /HPF (ref 0–3)

## 2018-05-07 ENCOUNTER — HOSPITAL ENCOUNTER (EMERGENCY)
Dept: HOSPITAL 6 - ED | Age: 28
Discharge: HOME | End: 2018-05-07
Payer: MEDICARE

## 2018-05-07 VITALS — DIASTOLIC BLOOD PRESSURE: 90 MMHG | SYSTOLIC BLOOD PRESSURE: 125 MMHG

## 2018-05-07 DIAGNOSIS — F17.200: ICD-10-CM

## 2018-05-07 DIAGNOSIS — L50.9: Primary | ICD-10-CM

## 2018-05-07 DIAGNOSIS — T40.4X5A: ICD-10-CM

## 2018-05-19 ENCOUNTER — HOSPITAL ENCOUNTER (EMERGENCY)
Dept: HOSPITAL 6 - ED | Age: 28
Discharge: HOME | End: 2018-05-19
Payer: MEDICARE

## 2018-05-19 VITALS — SYSTOLIC BLOOD PRESSURE: 126 MMHG | DIASTOLIC BLOOD PRESSURE: 75 MMHG

## 2018-05-19 DIAGNOSIS — M27.3: Primary | ICD-10-CM

## 2018-06-21 ENCOUNTER — HOSPITAL ENCOUNTER (EMERGENCY)
Dept: HOSPITAL 19 - COL.ER | Age: 28
LOS: 1 days | Discharge: HOME | End: 2018-06-22
Payer: MEDICARE

## 2018-06-21 VITALS — WEIGHT: 180.34 LBS | HEIGHT: 64.02 IN | BODY MASS INDEX: 30.79 KG/M2

## 2018-06-21 VITALS — TEMPERATURE: 98.7 F

## 2018-06-21 DIAGNOSIS — S16.1XXA: ICD-10-CM

## 2018-06-21 DIAGNOSIS — S29.9XXA: ICD-10-CM

## 2018-06-21 DIAGNOSIS — S09.90XA: Primary | ICD-10-CM
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
## 2018-06-21 LAB
ALBUMIN SERPL-MCNC: 3.8 GM/DL (ref 3.5–5)
ALP SERPL-CCNC: 59 U/L (ref 50–136)
ALT SERPL-CCNC: 24 U/L (ref 9–52)
ANION GAP SERPL CALC-SCNC: 13 MMOL/L (ref 7–16)
AST SERPL-CCNC: 28 U/L (ref 15–37)
BASOPHILS # BLD: 0.1 10*3/UL (ref 0–0.2)
BASOPHILS NFR BLD AUTO: 0.8 % (ref 0–2)
BILIRUB SERPL-MCNC: 0.2 MG/DL (ref 0–1)
BUN SERPL-MCNC: 9 MG/DL (ref 7–17)
CALCIUM SERPL-MCNC: 8.7 MG/DL (ref 8.4–10.2)
CHLORIDE SERPL-SCNC: 107 MMOL/L (ref 98–107)
CO2 SERPL-SCNC: 18 MMOL/L (ref 22–30)
CREAT SERPL-SCNC: 0.98 MG/DL (ref 0.52–1.25)
EOSINOPHIL # BLD: 0.3 10*3/UL (ref 0–0.7)
EOSINOPHIL NFR BLD: 4.7 % (ref 0–4)
ERYTHROCYTE [DISTWIDTH] IN BLOOD BY AUTOMATED COUNT: 12.4 % (ref 11.5–14.5)
GLUCOSE SERPL-MCNC: 97 MG/DL (ref 74–106)
GRANULOCYTES # BLD AUTO: 54.4 % (ref 42.2–75.2)
HCT VFR BLD AUTO: 36.5 % (ref 37–47)
HGB BLD-MCNC: 12.7 G/DL (ref 12.5–16)
LYMPHOCYTES # BLD: 2 10*3/UL (ref 1.2–3.4)
LYMPHOCYTES NFR BLD: 32.7 % (ref 20–51)
MCH RBC QN AUTO: 34 PG (ref 27–31)
MCHC RBC AUTO-ENTMCNC: 35 G/DL (ref 33–37)
MCV RBC AUTO: 96 FL (ref 80–100)
MONOCYTES # BLD: 0.4 10*3/UL (ref 0.1–0.6)
MONOCYTES NFR BLD AUTO: 7.2 % (ref 1.7–9.3)
NEUTROPHILS # BLD: 3.3 10*3/UL (ref 1.4–6.5)
PLATELET # BLD AUTO: 229 K/MM3 (ref 130–400)
PMV BLD AUTO: 10.2 FL (ref 7.4–10.4)
POTASSIUM SERPL-SCNC: 4.1 MMOL/L (ref 3.4–5)
PROT SERPL-MCNC: 7.1 GM/DL (ref 6.4–8.2)
RBC # BLD AUTO: 3.79 M/MM3 (ref 4.1–5.3)
SODIUM SERPL-SCNC: 139 MMOL/L (ref 137–145)

## 2018-06-22 VITALS — DIASTOLIC BLOOD PRESSURE: 89 MMHG | HEART RATE: 99 BPM | SYSTOLIC BLOOD PRESSURE: 128 MMHG

## 2018-08-21 ENCOUNTER — HOSPITAL ENCOUNTER (EMERGENCY)
Dept: HOSPITAL 6 - ED | Age: 28
Discharge: HOME | End: 2018-08-21
Payer: MEDICARE

## 2018-08-21 VITALS — SYSTOLIC BLOOD PRESSURE: 123 MMHG | DIASTOLIC BLOOD PRESSURE: 90 MMHG

## 2018-08-21 DIAGNOSIS — Z79.899: ICD-10-CM

## 2018-08-21 DIAGNOSIS — A08.4: Primary | ICD-10-CM

## 2018-08-21 DIAGNOSIS — F17.210: ICD-10-CM

## 2018-08-21 LAB
ALBUMIN SERPL-MCNC: 3.9 G/DL (ref 3.5–5)
ALT SERPL-CCNC: 31 U/L (ref 9–52)
APPEARANCE UR: CLEAR
AST SERPL-CCNC: 30 U/L (ref 14–36)
BASOPHILS # BLD: 0.1 10*3/UL (ref 0.02–0.1)
BILIRUB SERPL-MCNC: 0.2 MG/DL (ref 0.2–1.3)
BILIRUB UR QL STRIP.AUTO: NEGATIVE
CALCIUM SERPL-MCNC: 8.7 MG/DL (ref 8.4–10.2)
CO2 SERPL-SCNC: 23 MMOL/L (ref 22–30)
COLOR UR AUTO: (no result)
EOSINOPHIL # BLD: 0.3 10*3/UL (ref 0.04–0.4)
EOSINOPHIL NFR BLD: 3.1 % (ref 1–5)
ERYTHROCYTE [DISTWIDTH] IN BLOOD BY AUTOMATED COUNT: 12.7 % (ref 11.5–14.5)
GLUCOSE SERPL-MCNC: 99 MG/DL (ref 65–105)
GLUCOSE UR QL STRIP.AUTO: NEGATIVE MG/DL
HCT VFR BLD AUTO: 38.5 % (ref 37–47)
HGB BLD-MCNC: 13.1 G/DL (ref 12.5–16)
KETONES UR QL STRIP.AUTO: NEGATIVE
LEUKOCYTE ESTERASE UR QL STRIP: NEGATIVE
LYMPHOCYTES # BLD: 3.2 10*3/UL (ref 1.5–4)
MCH RBC QN AUTO: 34 PG (ref 27–31)
MCHC RBC AUTO-ENTMCNC: 34 G/DL (ref 33–37)
MCV RBC AUTO: 99 FL (ref 78–100)
MONOCYTES # BLD: 0.8 10*3/UL (ref 0.2–0.8)
NEUTROPHILS # BLD: 4 10*3/UL (ref 1.4–6.5)
NITRITE UR QL STRIP: NEGATIVE
PH UR STRIP.AUTO: 6.5 [PH] (ref 5–8)
PLATELET # BLD AUTO: 269 K/MM3 (ref 130–400)
PMV BLD AUTO: 9.9 FL (ref 7.4–10.4)
POTASSIUM SERPL-SCNC: 3.6 MMOL/L (ref 3.6–5)
PROT ?TM UR-MCNC: NEGATIVE MG/DL
PROT SERPL-MCNC: 6.9 G/DL (ref 6.3–8.2)
RBC # BLD AUTO: 3.9 M/MM3 (ref 4.1–5.3)
RBC UR QL AUTO: NEGATIVE
SODIUM SERPL-SCNC: 138 MMOL/L (ref 137–145)
SP GR UR STRIP.AUTO: 1 (ref 1–1.03)
UROBILINOGEN UR-MCNC: NORMAL MG/DL
WBC # BLD AUTO: 8.3 K/MM3 (ref 4.8–10.8)
WBC #/AREA URNS HPF: (no result) /HPF (ref 0–3)

## 2018-08-27 ENCOUNTER — HOSPITAL ENCOUNTER (INPATIENT)
Dept: HOSPITAL 19 - COL.ER | Age: 28
LOS: 1 days | Discharge: HOME | DRG: 918 | End: 2018-08-28
Attending: INTERNAL MEDICINE | Admitting: INTERNAL MEDICINE
Payer: MEDICARE

## 2018-08-27 VITALS — OXYGEN SATURATION: 94 %

## 2018-08-27 VITALS — OXYGEN SATURATION: 95 %

## 2018-08-27 VITALS — OXYGEN SATURATION: 96 %

## 2018-08-27 VITALS — OXYGEN SATURATION: 99 %

## 2018-08-27 VITALS
HEART RATE: 67 BPM | SYSTOLIC BLOOD PRESSURE: 99 MMHG | TEMPERATURE: 97.2 F | OXYGEN SATURATION: 97 % | DIASTOLIC BLOOD PRESSURE: 66 MMHG

## 2018-08-27 VITALS — OXYGEN SATURATION: 92 %

## 2018-08-27 VITALS — OXYGEN SATURATION: 97 %

## 2018-08-27 VITALS — OXYGEN SATURATION: 98 %

## 2018-08-27 VITALS — OXYGEN SATURATION: 93 %

## 2018-08-27 VITALS — OXYGEN SATURATION: 100 %

## 2018-08-27 VITALS — OXYGEN SATURATION: 91 %

## 2018-08-27 VITALS
OXYGEN SATURATION: 97 % | SYSTOLIC BLOOD PRESSURE: 111 MMHG | TEMPERATURE: 96.8 F | DIASTOLIC BLOOD PRESSURE: 75 MMHG | HEART RATE: 58 BPM

## 2018-08-27 VITALS
TEMPERATURE: 98 F | SYSTOLIC BLOOD PRESSURE: 97 MMHG | HEART RATE: 72 BPM | DIASTOLIC BLOOD PRESSURE: 64 MMHG | OXYGEN SATURATION: 97 %

## 2018-08-27 VITALS — OXYGEN SATURATION: 90 %

## 2018-08-27 VITALS — HEART RATE: 59 BPM | SYSTOLIC BLOOD PRESSURE: 103 MMHG | TEMPERATURE: 97 F | DIASTOLIC BLOOD PRESSURE: 73 MMHG

## 2018-08-27 VITALS
OXYGEN SATURATION: 97 % | DIASTOLIC BLOOD PRESSURE: 71 MMHG | HEART RATE: 55 BPM | TEMPERATURE: 97.6 F | SYSTOLIC BLOOD PRESSURE: 96 MMHG

## 2018-08-27 VITALS — HEIGHT: 62.99 IN | BODY MASS INDEX: 31.56 KG/M2 | WEIGHT: 178.13 LBS

## 2018-08-27 VITALS — OXYGEN SATURATION: 80 %

## 2018-08-27 VITALS — OXYGEN SATURATION: 89 %

## 2018-08-27 VITALS
OXYGEN SATURATION: 95 % | SYSTOLIC BLOOD PRESSURE: 107 MMHG | HEART RATE: 68 BPM | TEMPERATURE: 97.8 F | DIASTOLIC BLOOD PRESSURE: 73 MMHG

## 2018-08-27 VITALS — OXYGEN SATURATION: 88 %

## 2018-08-27 VITALS
SYSTOLIC BLOOD PRESSURE: 105 MMHG | OXYGEN SATURATION: 100 % | DIASTOLIC BLOOD PRESSURE: 73 MMHG | TEMPERATURE: 97.4 F | HEART RATE: 52 BPM

## 2018-08-27 VITALS
HEART RATE: 62 BPM | DIASTOLIC BLOOD PRESSURE: 82 MMHG | TEMPERATURE: 96.8 F | OXYGEN SATURATION: 96 % | SYSTOLIC BLOOD PRESSURE: 117 MMHG

## 2018-08-27 DIAGNOSIS — F44.9: ICD-10-CM

## 2018-08-27 DIAGNOSIS — F43.12: ICD-10-CM

## 2018-08-27 DIAGNOSIS — F32.9: ICD-10-CM

## 2018-08-27 DIAGNOSIS — F17.210: ICD-10-CM

## 2018-08-27 DIAGNOSIS — F15.129: ICD-10-CM

## 2018-08-27 DIAGNOSIS — T42.8X2A: Primary | ICD-10-CM

## 2018-08-27 LAB
ALBUMIN SERPL-MCNC: 4.6 GM/DL (ref 3.5–5)
ALP SERPL-CCNC: 55 U/L (ref 50–136)
ALT SERPL-CCNC: 29 U/L (ref 9–52)
ANION GAP SERPL CALC-SCNC: 12 MMOL/L (ref 7–16)
APAP SERPL-MCNC: < 10 UG/ML (ref 10–30)
AST SERPL-CCNC: 24 U/L (ref 15–37)
BASE EXCESS BLDA CALC-SCNC: -0.9 MMOL/L (ref -2–2)
BASE EXCESS BLDA CALC-SCNC: -1.8 MMOL/L (ref -2–2)
BASOPHILS # BLD: 0.1 10*3/UL (ref 0–0.2)
BASOPHILS NFR BLD AUTO: 0.6 % (ref 0–2)
BILIRUB SERPL-MCNC: 0.5 MG/DL (ref 0–1)
BUN SERPL-MCNC: 11 MG/DL (ref 7–17)
CALCIUM SERPL-MCNC: 9.2 MG/DL (ref 8.4–10.2)
CHLORIDE SERPL-SCNC: 108 MMOL/L (ref 98–107)
CO2 BLDA-SCNC: 24.7 MMOL/L
CO2 BLDA-SCNC: 25.6 MMOL/L
CO2 SERPL-SCNC: 21 MMOL/L (ref 22–30)
CREAT SERPL-SCNC: 0.77 MG/DL (ref 0.52–1.25)
DRUGS UR SCN NOM: NEGATIVE NG/ML
EOSINOPHIL # BLD: 0.1 10*3/UL (ref 0–0.7)
EOSINOPHIL NFR BLD: 0.6 % (ref 0–4)
ERYTHROCYTE [DISTWIDTH] IN BLOOD BY AUTOMATED COUNT: 12.7 % (ref 11.5–14.5)
ETHANOL SPEC-SCNC: < 10 MG/DL
GLUCOSE SERPL-MCNC: 110 MG/DL (ref 74–106)
GRANULOCYTES # BLD AUTO: 63.1 % (ref 42.2–75.2)
HCO3 BLDA-SCNC: 23.4 MEQ/L (ref 22–26)
HCO3 BLDA-SCNC: 24.3 MEQ/L (ref 22–26)
HCT VFR BLD AUTO: 39.4 % (ref 37–47)
HGB BLD-MCNC: 13.2 G/DL (ref 12.5–16)
INHALED O2 CONCENTRATION: 21 %
KETONES UR STRIP.AUTO-MCNC: (no result) MG/DL
KETONES UR STRIP.AUTO-MCNC: (no result) MG/DL
LYMPHOCYTES # BLD: 2.6 10*3/UL (ref 1.2–3.4)
LYMPHOCYTES NFR BLD: 25.5 % (ref 20–51)
MCH RBC QN AUTO: 33 PG (ref 27–31)
MCHC RBC AUTO-ENTMCNC: 34 G/DL (ref 33–37)
MCV RBC AUTO: 99 FL (ref 80–100)
MONOCYTES # BLD: 1 10*3/UL (ref 0.1–0.6)
MONOCYTES NFR BLD AUTO: 10 % (ref 1.7–9.3)
NEUTROPHILS # BLD: 6.4 10*3/UL (ref 1.4–6.5)
PCO2 BLDA: 41.2 MMHG (ref 35–45)
PCO2 BLDA: 42.3 MMHG (ref 35–45)
PH UR STRIP.AUTO: 5 [PH] (ref 5–8)
PH UR STRIP.AUTO: 5 [PH] (ref 5–8)
PLATELET # BLD AUTO: 251 K/MM3 (ref 130–400)
PMV BLD AUTO: 9.7 FL (ref 7.4–10.4)
PO2 BLDA: 127.3 MMHG (ref 80–100)
PO2 BLDA: 82.7 MMHG (ref 80–100)
POTASSIUM SERPL-SCNC: 3.4 MMOL/L (ref 3.4–5)
PROT SERPL-MCNC: 8 GM/DL (ref 6.4–8.2)
RBC # BLD AUTO: 3.97 M/MM3 (ref 4.1–5.3)
RBC # UR: (no result) /HPF
SALICYLATES SERPL-MCNC: < 1 MG/DL
SAO2 % BLDA: 95.9 % (ref 92–100)
SAO2 % BLDA: 97.7 % (ref 92–100)
SODIUM SERPL-SCNC: 141 MMOL/L (ref 137–145)
SP GR UR STRIP.AUTO: 1.02 (ref 1–1.03)
SP GR UR STRIP.AUTO: 1.02 (ref 1–1.03)
SQUAMOUS # URNS: (no result) /HPF
SQUAMOUS # URNS: (no result) /HPF
UA DIPSTICK PNL UR STRIP.AUTO: (no result)
UA DIPSTICK PNL UR STRIP.AUTO: (no result)
URN COLLECT METHOD CLASS: (no result)
URN COLLECT METHOD CLASS: (no result)
UROBILINOGEN UR STRIP.AUTO-MCNC: 2 MG/DL

## 2018-08-28 VITALS — OXYGEN SATURATION: 96 %

## 2018-08-28 VITALS — OXYGEN SATURATION: 95 %

## 2018-08-28 VITALS — OXYGEN SATURATION: 97 %

## 2018-08-28 VITALS — OXYGEN SATURATION: 99 %

## 2018-08-28 VITALS — SYSTOLIC BLOOD PRESSURE: 108 MMHG | HEART RATE: 63 BPM | TEMPERATURE: 98.2 F | DIASTOLIC BLOOD PRESSURE: 64 MMHG

## 2018-08-28 VITALS — OXYGEN SATURATION: 98 %

## 2018-08-28 VITALS — OXYGEN SATURATION: 94 %

## 2018-08-28 VITALS — OXYGEN SATURATION: 93 %

## 2018-08-28 VITALS — SYSTOLIC BLOOD PRESSURE: 100 MMHG | TEMPERATURE: 97 F | DIASTOLIC BLOOD PRESSURE: 77 MMHG | HEART RATE: 96 BPM

## 2018-08-28 VITALS — OXYGEN SATURATION: 100 %

## 2018-08-28 VITALS — HEART RATE: 87 BPM | SYSTOLIC BLOOD PRESSURE: 112 MMHG | DIASTOLIC BLOOD PRESSURE: 79 MMHG

## 2018-08-28 VITALS — OXYGEN SATURATION: 89 %

## 2018-08-28 VITALS — OXYGEN SATURATION: 90 %

## 2018-08-28 VITALS — OXYGEN SATURATION: 88 %

## 2018-08-28 LAB
ALBUMIN SERPL-MCNC: 3.5 GM/DL (ref 3.5–5)
ALP SERPL-CCNC: 42 U/L (ref 50–136)
ALT SERPL-CCNC: 27 U/L (ref 9–52)
ANION GAP SERPL CALC-SCNC: 8 MMOL/L (ref 7–16)
ANISOCYTOSIS BLD QL: (no result)
AST SERPL-CCNC: 18 U/L (ref 15–37)
BILIRUB SERPL-MCNC: 0.4 MG/DL (ref 0–1)
BUN SERPL-MCNC: 15 MG/DL (ref 7–17)
CALCIUM SERPL-MCNC: 8.2 MG/DL (ref 8.4–10.2)
CHLORIDE SERPL-SCNC: 110 MMOL/L (ref 98–107)
CO2 SERPL-SCNC: 21 MMOL/L (ref 22–30)
CREAT SERPL-SCNC: 0.55 MG/DL (ref 0.52–1.25)
EOSINOPHIL NFR BLD: 2 % (ref 0–4)
ERYTHROCYTE [DISTWIDTH] IN BLOOD BY AUTOMATED COUNT: 12.7 % (ref 11.5–14.5)
GLUCOSE SERPL-MCNC: 65 MG/DL (ref 74–106)
HCT VFR BLD AUTO: 35.8 % (ref 37–47)
HGB BLD-MCNC: 12.1 G/DL (ref 12.5–16)
INR BLD: 0.9 (ref 0.8–3)
LYMPHOCYTES NFR BLD MANUAL: 35 % (ref 20–51)
MAGNESIUM SERPL-MCNC: 2.1 MG/DL (ref 1.6–2.3)
MCH RBC QN AUTO: 34 PG (ref 27–31)
MCHC RBC AUTO-ENTMCNC: 34 G/DL (ref 33–37)
MCV RBC AUTO: 99 FL (ref 80–100)
MONOCYTES NFR BLD: 5 % (ref 1.7–9.3)
NEUTS BAND NFR BLD: 3 % (ref 0–10)
NEUTS SEG NFR BLD MANUAL: 55 % (ref 42–75.2)
PATH REV BLD -IMP: (no result)
PLATELET # BLD AUTO: 204 K/MM3 (ref 130–400)
PLATELET BLD QL SMEAR: NORMAL
PMV BLD AUTO: 9.7 FL (ref 7.4–10.4)
POLYCHROMASIA BLD QL SMEAR: (no result)
POTASSIUM SERPL-SCNC: 3.5 MMOL/L (ref 3.4–5)
PROT SERPL-MCNC: 6.3 GM/DL (ref 6.4–8.2)
PROTHROMBIN TIME: 10.6 SECONDS (ref 9.7–12.8)
RBC # BLD AUTO: 3.61 M/MM3 (ref 4.1–5.3)
SODIUM SERPL-SCNC: 140 MMOL/L (ref 137–145)

## 2019-01-08 ENCOUNTER — HOSPITAL ENCOUNTER (EMERGENCY)
Dept: HOSPITAL 6 - ED | Age: 29
Discharge: LEFT BEFORE DISCHARGE | End: 2019-01-08
Payer: MEDICARE

## 2019-01-08 VITALS — DIASTOLIC BLOOD PRESSURE: 99 MMHG | SYSTOLIC BLOOD PRESSURE: 142 MMHG

## 2019-01-08 DIAGNOSIS — Z88.1: ICD-10-CM

## 2019-01-08 DIAGNOSIS — Z88.5: ICD-10-CM

## 2019-01-08 DIAGNOSIS — R07.89: ICD-10-CM

## 2019-01-08 DIAGNOSIS — F41.9: ICD-10-CM

## 2019-01-08 DIAGNOSIS — Z87.448: ICD-10-CM

## 2019-01-08 DIAGNOSIS — Z87.442: ICD-10-CM

## 2019-01-08 DIAGNOSIS — M54.5: Primary | ICD-10-CM

## 2019-01-08 DIAGNOSIS — Z53.21: ICD-10-CM

## 2019-01-08 DIAGNOSIS — F32.9: ICD-10-CM

## 2019-01-08 DIAGNOSIS — Z88.8: ICD-10-CM

## 2019-01-08 DIAGNOSIS — Z79.899: ICD-10-CM

## 2019-01-08 DIAGNOSIS — F17.200: ICD-10-CM

## 2019-06-21 ENCOUNTER — HOSPITAL ENCOUNTER (EMERGENCY)
Dept: HOSPITAL 6 - ED | Age: 29
Discharge: HOME | End: 2019-06-21
Payer: MEDICARE

## 2019-06-21 VITALS — DIASTOLIC BLOOD PRESSURE: 87 MMHG | SYSTOLIC BLOOD PRESSURE: 123 MMHG

## 2019-06-21 DIAGNOSIS — M25.562: Primary | ICD-10-CM

## 2019-06-21 DIAGNOSIS — Z88.1: ICD-10-CM

## 2019-06-21 DIAGNOSIS — F41.9: ICD-10-CM

## 2019-06-21 DIAGNOSIS — R55: ICD-10-CM

## 2019-06-21 DIAGNOSIS — Z90.89: ICD-10-CM
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
## 2019-06-21 LAB
ALBUMIN SERPL-MCNC: 4.4 G/DL (ref 3.5–5)
ALT SERPL-CCNC: 19 U/L (ref 0–55)
APPEARANCE UR: CLEAR
AST SERPL-CCNC: 19 U/L (ref 5–34)
BASOPHILS # BLD: 0.1 10*3/UL (ref 0.02–0.1)
BILIRUB SERPL-MCNC: 0.5 MG/DL (ref 0.2–1.2)
BILIRUB UR QL STRIP.AUTO: NEGATIVE
CALCIUM SERPL-MCNC: 9.3 MG/DL (ref 8.3–10.5)
CO2 SERPL-SCNC: 22 MMOL/L (ref 22–29)
COLOR UR AUTO: YELLOW
EOSINOPHIL # BLD: 0.2 10*3/UL (ref 0.04–0.4)
EOSINOPHIL NFR BLD: 2.3 % (ref 1–5)
ERYTHROCYTE [DISTWIDTH] IN BLOOD BY AUTOMATED COUNT: 13.3 % (ref 11.5–14.5)
GLUCOSE SERPL-MCNC: 105 MG/DL (ref 65–105)
GLUCOSE UR QL STRIP.AUTO: NEGATIVE MG/DL
HCT VFR BLD AUTO: 41.9 % (ref 37–47)
HGB BLD-MCNC: 14.1 G/DL (ref 12.5–16)
KETONES UR QL STRIP.AUTO: NEGATIVE
LEUKOCYTE ESTERASE UR QL STRIP: NEGATIVE
LYMPHOCYTES # BLD: 2.9 10*3/UL (ref 1.5–4)
MCH RBC QN AUTO: 33 PG (ref 27–31)
MCHC RBC AUTO-ENTMCNC: 34 G/DL (ref 33–37)
MCV RBC AUTO: 97 FL (ref 78–100)
MONOCYTES # BLD: 0.5 10*3/UL (ref 0.2–0.8)
NEUTROPHILS # BLD: 3.5 10*3/UL (ref 1.4–6.5)
NITRITE UR QL STRIP: NEGATIVE
PH UR STRIP.AUTO: 5 [PH] (ref 5–8)
PLATELET # BLD AUTO: 322 K/MM3 (ref 130–400)
PMV BLD AUTO: 9.9 FL (ref 7.4–10.4)
POTASSIUM SERPL-SCNC: 4 MMOL/L (ref 3.5–5.1)
PROT ?TM UR-MCNC: NEGATIVE MG/DL
PROT SERPL-MCNC: 8 G/DL (ref 6.4–8.3)
RBC # BLD AUTO: 4.32 M/MM3 (ref 4.1–5.3)
RBC UR QL AUTO: NEGATIVE
SODIUM SERPL-SCNC: 139 MMOL/L (ref 136–145)
SP GR UR STRIP.AUTO: 1 (ref 1–1.03)
TROPONIN I SERPL-MCNC: < 0.03 NG/ML (ref ?–0.03)
UROBILINOGEN UR-MCNC: NORMAL MG/DL
WBC # BLD AUTO: 7.3 K/MM3 (ref 4.8–10.8)
WBC #/AREA URNS HPF: (no result) /HPF (ref 0–3)

## 2019-07-13 ENCOUNTER — HOSPITAL ENCOUNTER (EMERGENCY)
Dept: HOSPITAL 19 - COL.ER | Age: 29
Discharge: ANOTHER HOSPITAL | End: 2019-07-13
Payer: MEDICARE

## 2019-07-13 VITALS — WEIGHT: 230.38 LBS | HEIGHT: 67.01 IN | BODY MASS INDEX: 36.16 KG/M2

## 2019-07-13 VITALS — HEART RATE: 91 BPM | DIASTOLIC BLOOD PRESSURE: 70 MMHG | SYSTOLIC BLOOD PRESSURE: 103 MMHG

## 2019-07-13 VITALS — TEMPERATURE: 98.7 F

## 2019-07-13 DIAGNOSIS — F32.9: ICD-10-CM

## 2019-07-13 DIAGNOSIS — F41.9: ICD-10-CM

## 2019-07-13 DIAGNOSIS — R41.82: Primary | ICD-10-CM

## 2019-07-13 DIAGNOSIS — F43.10: ICD-10-CM

## 2019-07-13 LAB
ALBUMIN SERPL-MCNC: 3.7 GM/DL (ref 3.5–5)
ALP SERPL-CCNC: 48 U/L (ref 50–136)
ALT SERPL-CCNC: 14 U/L (ref 9–52)
ANION GAP SERPL CALC-SCNC: 7 MMOL/L (ref 7–16)
APAP SERPL-MCNC: < 10 UG/ML (ref 10–30)
AST SERPL-CCNC: 25 U/L (ref 15–37)
BASE EXCESS BLDA CALC-SCNC: -0.4 MMOL/L (ref -2–2)
BASOPHILS # BLD: 0 10*3/UL (ref 0–0.2)
BASOPHILS NFR BLD AUTO: 0.6 % (ref 0–2)
BILIRUB SERPL-MCNC: 0.2 MG/DL (ref 0–1)
BUN SERPL-MCNC: 6 MG/DL (ref 7–17)
CALCIUM SERPL-MCNC: 8.5 MG/DL (ref 8.4–10.2)
CHLORIDE SERPL-SCNC: 107 MMOL/L (ref 98–107)
CO2 BLDA-SCNC: 26.6 MMOL/L
CO2 SERPL-SCNC: 26 MMOL/L (ref 22–30)
CREAT SERPL-SCNC: 0.68 UMOL/L (ref 0.52–1.25)
DRUGS UR SCN NOM: NEGATIVE NG/ML
EOSINOPHIL # BLD: 0.3 10*3/UL (ref 0–0.7)
EOSINOPHIL NFR BLD: 4 % (ref 0–4)
ERYTHROCYTE [DISTWIDTH] IN BLOOD BY AUTOMATED COUNT: 13 % (ref 11.5–14.5)
ETHANOL SPEC-SCNC: < 10 MG/DL
GLUCOSE SERPL-MCNC: 86 MG/DL (ref 74–106)
GRANULOCYTES # BLD AUTO: 47.9 % (ref 42.2–75.2)
HCG SERPL QL: NEGATIVE
HCO3 BLDA-SCNC: 25.2 MEQ/L (ref 22–26)
HCT VFR BLD AUTO: 32.6 % (ref 37–47)
HGB BLD-MCNC: 11.1 G/DL (ref 12.5–16)
LYMPHOCYTES # BLD: 2.5 10*3/UL (ref 1.2–3.4)
LYMPHOCYTES NFR BLD: 38.8 % (ref 20–51)
MCH RBC QN AUTO: 33 PG (ref 27–31)
MCHC RBC AUTO-ENTMCNC: 34 G/DL (ref 33–37)
MCV RBC AUTO: 97 FL (ref 80–100)
MONOCYTES # BLD: 0.6 10*3/UL (ref 0.1–0.6)
MONOCYTES NFR BLD AUTO: 8.5 % (ref 1.7–9.3)
NEUTROPHILS # BLD: 3.1 10*3/UL (ref 1.4–6.5)
PCO2 BLDA: 45.4 MMHG (ref 35–45)
PH UR STRIP.AUTO: 6 [PH] (ref 5–8)
PLATELET # BLD AUTO: 252 K/MM3 (ref 130–400)
PMV BLD AUTO: 9.3 FL (ref 7.4–10.4)
PO2 BLDA: 99 MMHG (ref 80–100)
POTASSIUM SERPL-SCNC: 3.4 MMOL/L (ref 3.4–5)
PROT SERPL-MCNC: 6.6 GM/DL (ref 6.4–8.2)
RBC # BLD AUTO: 3.35 M/MM3 (ref 4.1–5.3)
RBC # UR: (no result) /HPF
SALICYLATES SERPL-MCNC: < 1 MG/DL
SAO2 % BLDA: 96.5 % (ref 92–100)
SODIUM SERPL-SCNC: 140 MMOL/L (ref 137–145)
SP GR UR STRIP.AUTO: 1.01 (ref 1–1.03)
SQUAMOUS # URNS: (no result) /HPF
TROPONIN I SERPL-MCNC: < 0.012 NG/ML (ref 0–0.04)
URN COLLECT METHOD CLASS: (no result)
WBC # UR: (no result) /HPF

## 2019-09-20 ENCOUNTER — HOSPITAL ENCOUNTER (EMERGENCY)
Dept: HOSPITAL 6 - ED | Age: 29
Discharge: HOME | End: 2019-09-20
Payer: MEDICARE

## 2019-09-20 VITALS — DIASTOLIC BLOOD PRESSURE: 87 MMHG | SYSTOLIC BLOOD PRESSURE: 124 MMHG

## 2019-09-20 DIAGNOSIS — F32.9: ICD-10-CM
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
## 2019-09-20 DIAGNOSIS — Z23: ICD-10-CM

## 2019-09-20 DIAGNOSIS — F41.9: ICD-10-CM
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
## 2019-09-20 DIAGNOSIS — S91.311A: Primary | ICD-10-CM

## 2019-10-03 ENCOUNTER — HOSPITAL ENCOUNTER (EMERGENCY)
Dept: HOSPITAL 6 - ED | Age: 29
Discharge: HOME | End: 2019-10-03
Payer: MEDICARE

## 2019-10-03 VITALS — SYSTOLIC BLOOD PRESSURE: 144 MMHG | DIASTOLIC BLOOD PRESSURE: 110 MMHG

## 2019-10-03 DIAGNOSIS — S91.311D: Primary | ICD-10-CM

## 2019-11-14 ENCOUNTER — HOSPITAL ENCOUNTER (EMERGENCY)
Dept: HOSPITAL 19 - COL.ER | Age: 29
Discharge: HOME | End: 2019-11-14
Payer: MEDICARE

## 2019-11-14 ENCOUNTER — HOSPITAL ENCOUNTER (EMERGENCY)
Dept: HOSPITAL 6 - ED | Age: 29
LOS: 1 days | Discharge: HOME | End: 2019-11-15
Payer: MEDICARE

## 2019-11-14 VITALS — WEIGHT: 230.38 LBS | HEIGHT: 65 IN | BODY MASS INDEX: 38.38 KG/M2

## 2019-11-14 VITALS — TEMPERATURE: 98.2 F

## 2019-11-14 VITALS — TEMPERATURE: 98.5 F | DIASTOLIC BLOOD PRESSURE: 76 MMHG | SYSTOLIC BLOOD PRESSURE: 117 MMHG

## 2019-11-14 VITALS — WEIGHT: 219.14 LBS | BODY MASS INDEX: 41.37 KG/M2 | HEIGHT: 60.98 IN

## 2019-11-14 VITALS — HEART RATE: 94 BPM

## 2019-11-14 VITALS — WEIGHT: 225.53 LBS | BODY MASS INDEX: 37.58 KG/M2 | HEIGHT: 65 IN

## 2019-11-14 VITALS — SYSTOLIC BLOOD PRESSURE: 114 MMHG | HEART RATE: 87 BPM | DIASTOLIC BLOOD PRESSURE: 86 MMHG

## 2019-11-14 DIAGNOSIS — F32.9: ICD-10-CM

## 2019-11-14 DIAGNOSIS — F41.9: ICD-10-CM

## 2019-11-14 DIAGNOSIS — Z87.891: ICD-10-CM

## 2019-11-14 DIAGNOSIS — R42: ICD-10-CM

## 2019-11-14 DIAGNOSIS — R51: Primary | ICD-10-CM

## 2019-11-14 DIAGNOSIS — F17.210: ICD-10-CM

## 2019-11-14 DIAGNOSIS — F43.9: Primary | ICD-10-CM

## 2019-11-14 DIAGNOSIS — F91.9: ICD-10-CM

## 2019-11-14 DIAGNOSIS — N39.0: Primary | ICD-10-CM

## 2019-11-14 DIAGNOSIS — J45.909: ICD-10-CM

## 2019-11-14 LAB
ALBUMIN SERPL-MCNC: 4.4 GM/DL (ref 3.5–5)
ALP SERPL-CCNC: 60 U/L (ref 50–136)
ALT SERPL-CCNC: 16 U/L (ref 9–52)
ANION GAP SERPL CALC-SCNC: 7 MMOL/L (ref 7–16)
APAP SERPL-MCNC: < 10 UG/ML (ref 10–30)
AST SERPL-CCNC: 36 U/L (ref 15–37)
BASOPHILS # BLD: 0.1 10*3/UL (ref 0–0.2)
BASOPHILS NFR BLD AUTO: 0.7 % (ref 0–2)
BILIRUB SERPL-MCNC: 0.3 MG/DL (ref 0–1)
BUN SERPL-MCNC: 8 MG/DL (ref 7–17)
CALCIUM SERPL-MCNC: 9.3 MG/DL (ref 8.4–10.2)
CHLORIDE SERPL-SCNC: 107 MMOL/L (ref 98–107)
CO2 SERPL-SCNC: 25 MMOL/L (ref 22–30)
CREAT SERPL-SCNC: 0.67 UMOL/L (ref 0.52–1.25)
CRP SERPL-MCNC: < 0.5 MG/DL (ref 0–0.9)
DIGOXIN SERPL-MCNC: 13.5 NG/ML (ref 3–18.6)
DRUGS UR SCN NOM: NEGATIVE NG/ML
EOSINOPHIL # BLD: 0.2 10*3/UL (ref 0–0.7)
EOSINOPHIL NFR BLD: 2.7 % (ref 0–4)
ERYTHROCYTE [DISTWIDTH] IN BLOOD BY AUTOMATED COUNT: 13.1 % (ref 11.5–14.5)
ETHANOL SPEC-SCNC: < 10 MG/DL
GLUCOSE SERPL-MCNC: 97 MG/DL (ref 74–106)
GRANULOCYTES # BLD AUTO: 63.1 % (ref 42.2–75.2)
HCG SERPL QL: NEGATIVE
HCT VFR BLD AUTO: 38.8 % (ref 37–47)
HGB BLD-MCNC: 12.9 G/DL (ref 12.5–16)
LYMPHOCYTES # BLD: 2.1 10*3/UL (ref 1.2–3.4)
LYMPHOCYTES NFR BLD: 24.1 % (ref 20–51)
MCH RBC QN AUTO: 32 PG (ref 27–31)
MCHC RBC AUTO-ENTMCNC: 33 G/DL (ref 33–37)
MCV RBC AUTO: 97 FL (ref 80–100)
MONOCYTES # BLD: 0.8 10*3/UL (ref 0.1–0.6)
MONOCYTES NFR BLD AUTO: 9.2 % (ref 1.7–9.3)
NEUTROPHILS # BLD: 5.6 10*3/UL (ref 1.4–6.5)
PH UR STRIP.AUTO: 6 [PH] (ref 5–8)
PLATELET # BLD AUTO: 288 K/MM3 (ref 130–400)
PMV BLD AUTO: 9.8 FL (ref 7.4–10.4)
POTASSIUM SERPL-SCNC: 4 MMOL/L (ref 3.4–5)
PROT SERPL-MCNC: 7.7 GM/DL (ref 6.4–8.2)
RBC # BLD AUTO: 4.01 M/MM3 (ref 4.1–5.3)
RBC # UR: (no result) /HPF
SALICYLATES SERPL-MCNC: < 1 MG/DL
SODIUM SERPL-SCNC: 139 MMOL/L (ref 137–145)
SP GR UR STRIP.AUTO: 1 (ref 1–1.03)
SQUAMOUS # URNS: (no result) /HPF
URN COLLECT METHOD CLASS: (no result)

## 2019-11-15 VITALS — DIASTOLIC BLOOD PRESSURE: 81 MMHG | SYSTOLIC BLOOD PRESSURE: 115 MMHG

## 2019-11-17 ENCOUNTER — HOSPITAL ENCOUNTER (EMERGENCY)
Dept: HOSPITAL 19 - COL.ER | Age: 29
Discharge: HOME | End: 2019-11-17
Payer: MEDICARE

## 2019-11-17 VITALS — HEART RATE: 88 BPM

## 2019-11-17 VITALS — WEIGHT: 220.46 LBS | BODY MASS INDEX: 37.64 KG/M2 | HEIGHT: 64.02 IN

## 2019-11-17 VITALS — HEART RATE: 105 BPM | DIASTOLIC BLOOD PRESSURE: 96 MMHG | TEMPERATURE: 97.5 F | SYSTOLIC BLOOD PRESSURE: 131 MMHG

## 2019-11-17 VITALS — HEIGHT: 64.02 IN | WEIGHT: 200.4 LBS | BODY MASS INDEX: 34.21 KG/M2

## 2019-11-17 VITALS — DIASTOLIC BLOOD PRESSURE: 84 MMHG | SYSTOLIC BLOOD PRESSURE: 123 MMHG

## 2019-11-17 VITALS — TEMPERATURE: 98.9 F

## 2019-11-17 DIAGNOSIS — F32.9: ICD-10-CM

## 2019-11-17 DIAGNOSIS — T74.21XA: Primary | ICD-10-CM

## 2019-11-17 DIAGNOSIS — N39.0: ICD-10-CM

## 2019-11-17 DIAGNOSIS — F43.10: ICD-10-CM
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
## 2019-11-17 DIAGNOSIS — F17.210: ICD-10-CM

## 2019-11-17 DIAGNOSIS — R41.82: Primary | ICD-10-CM

## 2019-11-17 DIAGNOSIS — F41.9: ICD-10-CM

## 2019-11-17 DIAGNOSIS — E66.9: ICD-10-CM

## 2019-11-17 LAB
ALBUMIN SERPL-MCNC: 4.3 GM/DL (ref 3.5–5)
ALP SERPL-CCNC: 57 U/L (ref 50–136)
ALT SERPL-CCNC: 17 U/L (ref 9–52)
ANION GAP SERPL CALC-SCNC: 9 MMOL/L (ref 7–16)
APAP SERPL-MCNC: < 10 UG/ML (ref 10–30)
APAP SERPL-MCNC: < 10 UG/ML (ref 10–30)
AST SERPL-CCNC: 34 U/L (ref 15–37)
BASOPHILS # BLD: 0.1 10*3/UL (ref 0–0.2)
BASOPHILS NFR BLD AUTO: 0.4 % (ref 0–2)
BILIRUB SERPL-MCNC: 0.3 MG/DL (ref 0–1)
BUN SERPL-MCNC: 3 MG/DL (ref 7–17)
CALCIUM SERPL-MCNC: 9.1 MG/DL (ref 8.4–10.2)
CHLORIDE SERPL-SCNC: 108 MMOL/L (ref 98–107)
CO2 SERPL-SCNC: 25 MMOL/L (ref 22–30)
CREAT SERPL-SCNC: 0.66 UMOL/L (ref 0.52–1.25)
DRUGS UR SCN NOM: NEGATIVE NG/ML
DRUGS UR SCN NOM: NEGATIVE NG/ML
EOSINOPHIL # BLD: 0.2 10*3/UL (ref 0–0.7)
EOSINOPHIL NFR BLD: 1.5 % (ref 0–4)
ERYTHROCYTE [DISTWIDTH] IN BLOOD BY AUTOMATED COUNT: 13.2 % (ref 11.5–14.5)
ETHANOL SPEC-SCNC: < 10 MG/DL
GLUCOSE SERPL-MCNC: 91 MG/DL (ref 74–106)
GRANULOCYTES # BLD AUTO: 72.4 % (ref 42.2–75.2)
HCG SERPL QL: NEGATIVE
HCT VFR BLD AUTO: 37.4 % (ref 37–47)
HGB BLD-MCNC: 12.8 G/DL (ref 12.5–16)
LIPASE SERPL-CCNC: 17 U/L (ref 23–300)
LYMPHOCYTES # BLD: 2.5 10*3/UL (ref 1.2–3.4)
LYMPHOCYTES NFR BLD: 18.3 % (ref 20–51)
MCH RBC QN AUTO: 33 PG (ref 27–31)
MCHC RBC AUTO-ENTMCNC: 34 G/DL (ref 33–37)
MCV RBC AUTO: 96 FL (ref 80–100)
MONOCYTES # BLD: 1 10*3/UL (ref 0.1–0.6)
MONOCYTES NFR BLD AUTO: 7 % (ref 1.7–9.3)
NEUTROPHILS # BLD: 9.8 10*3/UL (ref 1.4–6.5)
PH UR STRIP.AUTO: 6 [PH] (ref 5–8)
PLATELET # BLD AUTO: 269 K/MM3 (ref 130–400)
PMV BLD AUTO: 9.6 FL (ref 7.4–10.4)
POTASSIUM SERPL-SCNC: 3.6 MMOL/L (ref 3.4–5)
PROT SERPL-MCNC: 7.4 GM/DL (ref 6.4–8.2)
RBC # BLD AUTO: 3.88 M/MM3 (ref 4.1–5.3)
RBC # UR STRIP.AUTO: (no result) /UL
RBC # UR: (no result) /HPF
SALICYLATES SERPL-MCNC: < 1 MG/DL
SALICYLATES SERPL-MCNC: < 1 MG/DL
SODIUM SERPL-SCNC: 141 MMOL/L (ref 137–145)
SP GR UR STRIP.AUTO: 1 (ref 1–1.03)
SQUAMOUS # URNS: (no result) /HPF
URINE LEUKOCYTE ESTERASE: (no result)
URN COLLECT METHOD CLASS: (no result)

## 2019-11-18 ENCOUNTER — HOSPITAL ENCOUNTER (EMERGENCY)
Dept: HOSPITAL 19 - COL.ER | Age: 29
Discharge: HOME | End: 2019-11-18
Payer: MEDICARE

## 2019-11-18 ENCOUNTER — HOSPITAL ENCOUNTER (EMERGENCY)
Dept: HOSPITAL 6 - ED | Age: 29
Discharge: HOME | End: 2019-11-18
Payer: MEDICARE

## 2019-11-18 VITALS — WEIGHT: 220.46 LBS | BODY MASS INDEX: 37.64 KG/M2 | HEIGHT: 64.02 IN

## 2019-11-18 VITALS — WEIGHT: 217.38 LBS | HEIGHT: 60 IN | BODY MASS INDEX: 42.68 KG/M2

## 2019-11-18 VITALS — DIASTOLIC BLOOD PRESSURE: 82 MMHG | SYSTOLIC BLOOD PRESSURE: 145 MMHG

## 2019-11-18 VITALS — SYSTOLIC BLOOD PRESSURE: 141 MMHG | DIASTOLIC BLOOD PRESSURE: 88 MMHG | TEMPERATURE: 98.3 F

## 2019-11-18 VITALS — HEART RATE: 102 BPM

## 2019-11-18 DIAGNOSIS — F41.9: ICD-10-CM

## 2019-11-18 DIAGNOSIS — F32.9: ICD-10-CM

## 2019-11-18 DIAGNOSIS — G43.909: Primary | ICD-10-CM

## 2019-11-18 DIAGNOSIS — G47.00: ICD-10-CM

## 2019-11-18 DIAGNOSIS — Z72.89: ICD-10-CM

## 2019-11-18 DIAGNOSIS — Z88.6: ICD-10-CM

## 2019-11-18 DIAGNOSIS — J45.909: ICD-10-CM

## 2019-11-18 DIAGNOSIS — Z88.8: ICD-10-CM

## 2019-11-18 DIAGNOSIS — F17.210: ICD-10-CM

## 2019-11-18 DIAGNOSIS — G44.209: Primary | ICD-10-CM

## 2019-11-18 DIAGNOSIS — Z88.1: ICD-10-CM

## 2019-11-19 ENCOUNTER — HOSPITAL ENCOUNTER (EMERGENCY)
Dept: HOSPITAL 19 - COL.ER | Age: 29
Discharge: HOME | End: 2019-11-19
Payer: MEDICARE

## 2019-11-19 ENCOUNTER — HOSPITAL ENCOUNTER (EMERGENCY)
Dept: HOSPITAL 19 - COL.ER | Age: 29
LOS: 1 days | Discharge: PSYCHIATRIC OR SUBSTANCE USE FACILITY | End: 2019-11-20
Payer: MEDICARE

## 2019-11-19 VITALS — HEART RATE: 104 BPM | TEMPERATURE: 98.7 F | DIASTOLIC BLOOD PRESSURE: 90 MMHG | SYSTOLIC BLOOD PRESSURE: 126 MMHG

## 2019-11-19 VITALS — BODY MASS INDEX: 31.85 KG/M2 | HEIGHT: 62.01 IN | WEIGHT: 175.27 LBS

## 2019-11-19 DIAGNOSIS — F32.9: Primary | ICD-10-CM

## 2019-11-19 DIAGNOSIS — F32.9: ICD-10-CM

## 2019-11-19 DIAGNOSIS — F17.210: ICD-10-CM

## 2019-11-19 DIAGNOSIS — J45.909: ICD-10-CM

## 2019-11-19 DIAGNOSIS — T50.912A: Primary | ICD-10-CM

## 2019-11-19 LAB
ALBUMIN SERPL-MCNC: 4.3 GM/DL (ref 3.5–5)
ALP SERPL-CCNC: 69 U/L (ref 50–136)
ALT SERPL-CCNC: 17 U/L (ref 9–52)
ANION GAP SERPL CALC-SCNC: 9 MMOL/L (ref 7–16)
APAP SERPL-MCNC: 11 UG/ML (ref 10–30)
AST SERPL-CCNC: 25 U/L (ref 15–37)
BASOPHILS # BLD: 0 10*3/UL (ref 0–0.2)
BASOPHILS # BLD: 0 10*3/UL (ref 0–0.2)
BASOPHILS NFR BLD AUTO: 0.3 % (ref 0–2)
BASOPHILS NFR BLD AUTO: 0.4 % (ref 0–2)
BILIRUB SERPL-MCNC: 0.1 MG/DL (ref 0–1)
BUN SERPL-MCNC: 5 MG/DL (ref 7–17)
CALCIUM SERPL-MCNC: 9 MG/DL (ref 8.4–10.2)
CHLORIDE SERPL-SCNC: 109 MMOL/L (ref 98–107)
CO2 SERPL-SCNC: 24 MMOL/L (ref 22–30)
CREAT SERPL-SCNC: 0.71 UMOL/L (ref 0.52–1.25)
DRUGS UR SCN NOM: NEGATIVE NG/ML
EOSINOPHIL # BLD: 0.1 10*3/UL (ref 0–0.7)
EOSINOPHIL # BLD: 0.2 10*3/UL (ref 0–0.7)
EOSINOPHIL NFR BLD: 1.7 % (ref 0–4)
EOSINOPHIL NFR BLD: 1.9 % (ref 0–4)
ERYTHROCYTE [DISTWIDTH] IN BLOOD BY AUTOMATED COUNT: 13.2 % (ref 11.5–14.5)
ERYTHROCYTE [DISTWIDTH] IN BLOOD BY AUTOMATED COUNT: 13.4 % (ref 11.5–14.5)
ETHANOL SPEC-SCNC: < 10 MG/DL
GLUCOSE SERPL-MCNC: 110 MG/DL (ref 74–106)
GRANULOCYTES # BLD AUTO: 65.3 % (ref 42.2–75.2)
GRANULOCYTES # BLD AUTO: 83.5 % (ref 42.2–75.2)
HCT VFR BLD AUTO: 37.3 % (ref 37–47)
HGB BLD-MCNC: 12.2 G/DL (ref 12.5–16)
HGB BLD-MCNC: 12.5 G/DL (ref 12.5–16)
KETONES UR STRIP.AUTO-MCNC: (no result) MG/DL
LYMPHOCYTES # BLD: 1 10*3/UL (ref 1.2–3.4)
LYMPHOCYTES # BLD: 1.8 10*3/UL (ref 1.2–3.4)
LYMPHOCYTES NFR BLD: 24.6 % (ref 20–51)
LYMPHOCYTES NFR BLD: 7.8 % (ref 20–51)
MCH RBC QN AUTO: 32 PG (ref 27–31)
MCH RBC QN AUTO: 33 PG (ref 27–31)
MCHC RBC AUTO-ENTMCNC: 34 G/DL (ref 33–37)
MCHC RBC AUTO-ENTMCNC: 34 G/DL (ref 33–37)
MCV RBC AUTO: 97 FL (ref 80–100)
MCV RBC AUTO: 97 FL (ref 80–100)
MONOCYTES # BLD: 0.6 10*3/UL (ref 0.1–0.6)
MONOCYTES # BLD: 0.8 10*3/UL (ref 0.1–0.6)
MONOCYTES NFR BLD AUTO: 6.4 % (ref 1.7–9.3)
MONOCYTES NFR BLD AUTO: 7.7 % (ref 1.7–9.3)
NEUTROPHILS # BLD: 10.3 10*3/UL (ref 1.4–6.5)
NEUTROPHILS # BLD: 4.9 10*3/UL (ref 1.4–6.5)
PH UR STRIP.AUTO: 5 [PH] (ref 5–8)
PLATELET # BLD AUTO: 254 K/MM3 (ref 130–400)
PLATELET # BLD AUTO: 275 K/MM3 (ref 130–400)
PMV BLD AUTO: 9.6 FL (ref 7.4–10.4)
PMV BLD AUTO: 9.9 FL (ref 7.4–10.4)
POTASSIUM SERPL-SCNC: 3.1 MMOL/L (ref 3.4–5)
PROT SERPL-MCNC: 7.4 GM/DL (ref 6.4–8.2)
RBC # BLD AUTO: 3.73 M/MM3 (ref 4.1–5.3)
RBC # BLD AUTO: 3.86 M/MM3 (ref 4.1–5.3)
RBC # UR: (no result) /HPF
SALICYLATES SERPL-MCNC: 12.7 MG/DL
SODIUM SERPL-SCNC: 142 MMOL/L (ref 137–145)
SP GR UR STRIP.AUTO: 1.01 (ref 1–1.03)
SQUAMOUS # URNS: (no result) /HPF
TSH SERPL DL<=0.005 MIU/L-ACNC: 1.34 UIU/ML (ref 0.47–4.68)
URINE LEUKOCYTE ESTERASE: (no result)
URN COLLECT METHOD CLASS: (no result)
URN COLLECT METHOD CLASS: (no result)

## 2019-11-20 VITALS — HEART RATE: 74 BPM | DIASTOLIC BLOOD PRESSURE: 78 MMHG | TEMPERATURE: 98.1 F | SYSTOLIC BLOOD PRESSURE: 110 MMHG

## 2019-11-20 LAB
ALBUMIN SERPL-MCNC: 4.1 GM/DL (ref 3.5–5)
ALP SERPL-CCNC: 63 U/L (ref 50–136)
ALT SERPL-CCNC: 24 U/L (ref 9–52)
ANION GAP SERPL CALC-SCNC: 10 MMOL/L (ref 7–16)
APAP SERPL-MCNC: < 10 UG/ML (ref 10–30)
APAP SERPL-MCNC: < 10 UG/ML (ref 10–30)
AST SERPL-CCNC: 28 U/L (ref 15–37)
BILIRUB SERPL-MCNC: 0.2 MG/DL (ref 0–1)
BUN SERPL-MCNC: 3 MG/DL (ref 7–17)
CALCIUM SERPL-MCNC: 9 MG/DL (ref 8.4–10.2)
CHLORIDE SERPL-SCNC: 106 MMOL/L (ref 98–107)
CO2 SERPL-SCNC: 23 MMOL/L (ref 22–30)
CREAT SERPL-SCNC: 0.73 UMOL/L (ref 0.52–1.25)
CRP SERPL-MCNC: 4.4 MG/DL (ref 0–0.9)
DRUGS UR SCN NOM: NEGATIVE NG/ML
ETHANOL SPEC-SCNC: < 10 MG/DL
GLUCOSE SERPL-MCNC: 140 MG/DL (ref 74–106)
HCT VFR BLD AUTO: 36.1 % (ref 37–47)
PH UR STRIP.AUTO: 6 [PH] (ref 5–8)
POTASSIUM SERPL-SCNC: 2.7 MMOL/L (ref 3.4–5)
PROT SERPL-MCNC: 7 GM/DL (ref 6.4–8.2)
RBC # UR: (no result) /HPF
SALICYLATES SERPL-MCNC: 1.1 MG/DL
SALICYLATES SERPL-MCNC: 1.3 MG/DL
SODIUM SERPL-SCNC: 139 MMOL/L (ref 137–145)
SP GR UR STRIP.AUTO: 1.01 (ref 1–1.03)
SQUAMOUS # URNS: (no result) /HPF
TROPONIN I SERPL-MCNC: < 0.012 NG/ML (ref 0–0.04)

## 2019-11-20 NOTE — NUR
(LATE ENTRY 11/19/19) MSW student responded to a  consult in
the ED due to her frequency of visits to the ED.  The patient was agreeable to
sign up for the Community Care Team. The patient's father pick-up the patient.
MSW student collaborated the above information with the patient's nurse.

## 2019-12-06 ENCOUNTER — HOSPITAL ENCOUNTER (EMERGENCY)
Dept: HOSPITAL 6 - ED | Age: 29
Discharge: ANOTHER HOSPITAL | End: 2019-12-06
Payer: MEDICARE

## 2019-12-06 VITALS — DIASTOLIC BLOOD PRESSURE: 84 MMHG | SYSTOLIC BLOOD PRESSURE: 125 MMHG

## 2019-12-06 DIAGNOSIS — F41.9: ICD-10-CM

## 2019-12-06 DIAGNOSIS — F32.9: ICD-10-CM

## 2019-12-06 DIAGNOSIS — F17.210: ICD-10-CM

## 2019-12-06 DIAGNOSIS — T42.8X2A: Primary | ICD-10-CM

## 2019-12-06 LAB
ALBUMIN SERPL-MCNC: 4.4 G/DL (ref 3.5–5)
ALT SERPL-CCNC: 26 U/L (ref 0–55)
APAP SERPL-MCNC: < 1 UG/ML
AST SERPL-CCNC: 22 U/L (ref 5–34)
BASOPHILS # BLD: 0.1 10*3/UL (ref 0.02–0.1)
BILIRUB SERPL-MCNC: 0.3 MG/DL (ref 0.2–1.2)
CALCIUM SERPL-MCNC: 9.5 MG/DL (ref 8.3–10.5)
CO2 SERPL-SCNC: 19 MMOL/L (ref 22–29)
EOSINOPHIL # BLD: 0.3 10*3/UL (ref 0.04–0.4)
EOSINOPHIL NFR BLD: 2.7 % (ref 1–5)
ERYTHROCYTE [DISTWIDTH] IN BLOOD BY AUTOMATED COUNT: 13.6 % (ref 11.5–14.5)
ETHANOL SERPL-MCNC: 99 MG/DL (ref ?–10)
GLUCOSE SERPL-MCNC: 108 MG/DL (ref 65–105)
HCT VFR BLD AUTO: 42 % (ref 37–47)
HGB BLD-MCNC: 14.1 G/DL (ref 12.5–16)
LYMPHOCYTES # BLD: 3.1 10*3/UL (ref 1.5–4)
MCH RBC QN AUTO: 32 PG (ref 27–31)
MCHC RBC AUTO-ENTMCNC: 34 G/DL (ref 33–37)
MCV RBC AUTO: 95 FL (ref 78–100)
MONOCYTES # BLD: 0.7 10*3/UL (ref 0.2–0.8)
NEUTROPHILS # BLD: 5.1 10*3/UL (ref 1.4–6.5)
PLATELET # BLD AUTO: 399 K/MM3 (ref 130–400)
PMV BLD AUTO: 9.6 FL (ref 7.4–10.4)
POTASSIUM SERPL-SCNC: 4 MMOL/L (ref 3.5–5.1)
PROT SERPL-MCNC: 8 G/DL (ref 6.4–8.3)
RBC # BLD AUTO: 4.43 M/MM3 (ref 4.1–5.3)
SODIUM SERPL-SCNC: 142 MMOL/L (ref 136–145)
WBC # BLD AUTO: 9.2 K/MM3 (ref 4.8–10.8)

## 2021-01-12 ENCOUNTER — HOSPITAL ENCOUNTER (EMERGENCY)
Dept: HOSPITAL 6 - ED | Age: 31
Discharge: HOME | End: 2021-01-12
Payer: MEDICARE

## 2021-01-12 VITALS — DIASTOLIC BLOOD PRESSURE: 103 MMHG | SYSTOLIC BLOOD PRESSURE: 170 MMHG

## 2021-01-12 DIAGNOSIS — F41.9: Primary | ICD-10-CM

## 2021-01-12 DIAGNOSIS — Z88.1: ICD-10-CM

## 2021-01-12 DIAGNOSIS — Z88.2: ICD-10-CM

## 2021-01-12 DIAGNOSIS — Z88.6: ICD-10-CM

## 2021-01-12 DIAGNOSIS — Z81.8: ICD-10-CM

## 2021-01-12 DIAGNOSIS — Z88.8: ICD-10-CM

## 2021-01-12 LAB
ALBUMIN SERPL-MCNC: 4.6 G/DL (ref 3.5–5)
ALT SERPL-CCNC: 13 U/L (ref 0–55)
AST SERPL-CCNC: 15 U/L (ref 5–34)
BASOPHILS # BLD: 0 10*3/UL (ref 0.02–0.1)
BILIRUB SERPL-MCNC: 0.4 MG/DL (ref 0.2–1.2)
CALCIUM SERPL-MCNC: 9.6 MG/DL (ref 8.3–10.5)
CO2 SERPL-SCNC: 18 MMOL/L (ref 22–29)
D DIMER PPP FEU-MCNC: 0.02 MG/L FEU (ref 0.15–0.5)
EOSINOPHIL # BLD: 0 10*3/UL (ref 0.04–0.4)
EOSINOPHIL NFR BLD: 0.4 % (ref 1–5)
ERYTHROCYTE [DISTWIDTH] IN BLOOD BY AUTOMATED COUNT: 13 % (ref 11.5–14.5)
GLUCOSE SERPL-MCNC: 100 MG/DL (ref 65–105)
HCT VFR BLD AUTO: 40.5 % (ref 37–47)
HGB BLD-MCNC: 13.5 G/DL (ref 12.5–16)
LYMPHOCYTES # BLD: 2.3 10*3/UL (ref 1.5–4)
MCH RBC QN AUTO: 30 PG (ref 27–31)
MCHC RBC AUTO-ENTMCNC: 33 G/DL (ref 33–37)
MCV RBC AUTO: 91 FL (ref 78–100)
MONOCYTES # BLD: 0.5 10*3/UL (ref 0.2–0.8)
NEUTROPHILS # BLD: 4.9 10*3/UL (ref 1.4–6.5)
PLATELET # BLD AUTO: 304 K/MM3 (ref 130–400)
PMV BLD AUTO: 9.3 FL (ref 7.4–10.4)
POTASSIUM SERPL-SCNC: 3.9 MMOL/L (ref 3.5–5.1)
PROT SERPL-MCNC: 7.8 G/DL (ref 6.4–8.3)
RBC # BLD AUTO: 4.47 M/MM3 (ref 4.1–5.3)
SODIUM SERPL-SCNC: 140 MMOL/L (ref 136–145)
WBC # BLD AUTO: 7.8 K/MM3 (ref 4.8–10.8)

## 2021-02-01 ENCOUNTER — HOSPITAL ENCOUNTER (EMERGENCY)
Dept: HOSPITAL 6 - ED | Age: 31
Discharge: HOME | End: 2021-02-01
Payer: MEDICARE

## 2021-02-01 VITALS — HEIGHT: 64.02 IN | BODY MASS INDEX: 38.84 KG/M2 | WEIGHT: 227.52 LBS

## 2021-02-01 VITALS — SYSTOLIC BLOOD PRESSURE: 126 MMHG | DIASTOLIC BLOOD PRESSURE: 84 MMHG

## 2021-02-01 DIAGNOSIS — F41.8: Primary | ICD-10-CM

## 2021-02-01 DIAGNOSIS — Z88.8: ICD-10-CM

## 2021-02-01 DIAGNOSIS — Z88.5: ICD-10-CM

## 2021-02-01 DIAGNOSIS — F43.10: ICD-10-CM

## 2021-02-01 DIAGNOSIS — Z88.6: ICD-10-CM

## 2021-02-01 DIAGNOSIS — Z88.2: ICD-10-CM

## 2021-02-01 DIAGNOSIS — F32.9: ICD-10-CM

## 2021-02-01 DIAGNOSIS — Z88.1: ICD-10-CM

## 2021-02-03 ENCOUNTER — HOSPITAL ENCOUNTER (EMERGENCY)
Dept: HOSPITAL 6 - ED | Age: 31
Discharge: HOME | End: 2021-02-03
Payer: MEDICARE

## 2021-02-03 VITALS — SYSTOLIC BLOOD PRESSURE: 133 MMHG | DIASTOLIC BLOOD PRESSURE: 89 MMHG

## 2021-02-03 DIAGNOSIS — Z88.1: ICD-10-CM

## 2021-02-03 DIAGNOSIS — Z88.6: ICD-10-CM

## 2021-02-03 DIAGNOSIS — Z88.8: ICD-10-CM
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
## 2021-02-03 DIAGNOSIS — Z88.2: ICD-10-CM

## 2021-02-03 DIAGNOSIS — S51.812A: Primary | ICD-10-CM

## 2021-02-13 ENCOUNTER — HOSPITAL ENCOUNTER (EMERGENCY)
Dept: HOSPITAL 6 - ED | Age: 31
Discharge: HOME | End: 2021-02-13
Payer: MEDICARE

## 2021-02-13 ENCOUNTER — HOSPITAL ENCOUNTER (OUTPATIENT)
Dept: HOSPITAL 6 - LAB | Age: 31
End: 2021-02-13
Attending: FAMILY MEDICINE
Payer: MEDICARE

## 2021-02-13 VITALS — SYSTOLIC BLOOD PRESSURE: 136 MMHG | DIASTOLIC BLOOD PRESSURE: 87 MMHG

## 2021-02-13 VITALS — DIASTOLIC BLOOD PRESSURE: 86 MMHG | SYSTOLIC BLOOD PRESSURE: 133 MMHG

## 2021-02-13 VITALS — WEIGHT: 233.91 LBS | HEIGHT: 55 IN

## 2021-02-13 DIAGNOSIS — N39.0: ICD-10-CM

## 2021-02-13 DIAGNOSIS — R82.5: Primary | ICD-10-CM

## 2021-02-13 DIAGNOSIS — M79.632: Primary | ICD-10-CM

## 2021-02-13 LAB
APPEARANCE UR: CLEAR
BASOPHILS # BLD: 0 10*3/UL (ref 0.02–0.1)
BILIRUB UR QL STRIP.AUTO: NEGATIVE
COLOR UR AUTO: YELLOW
EOSINOPHIL # BLD: 0.2 10*3/UL (ref 0.04–0.4)
EOSINOPHIL NFR BLD: 2.7 % (ref 1–5)
ERYTHROCYTE [DISTWIDTH] IN BLOOD BY AUTOMATED COUNT: 14.4 % (ref 11.5–14.5)
GLUCOSE UR QL STRIP.AUTO: NEGATIVE MG/DL
HCT VFR BLD AUTO: 39.5 % (ref 37–47)
HGB BLD-MCNC: 12.8 G/DL (ref 12.5–16)
KETONES UR QL STRIP.AUTO: (no result)
LEUKOCYTE ESTERASE UR QL STRIP: (no result)
LYMPHOCYTES # BLD: 1.4 10*3/UL (ref 1.5–4)
MCH RBC QN AUTO: 31 PG (ref 27–31)
MCHC RBC AUTO-ENTMCNC: 32 G/DL (ref 33–37)
MCV RBC AUTO: 95 FL (ref 78–100)
MONOCYTES # BLD: 0.7 10*3/UL (ref 0.2–0.8)
NEUTROPHILS # BLD: 5.5 10*3/UL (ref 1.4–6.5)
NITRITE UR QL STRIP: POSITIVE
PH UR STRIP.AUTO: 5.5 [PH] (ref 5–8)
PLATELET # BLD AUTO: 289 K/MM3 (ref 130–400)
PMV BLD AUTO: 9.9 FL (ref 7.4–10.4)
PROT ?TM UR-MCNC: (no result) MG/DL
RBC # BLD AUTO: 4.15 M/MM3 (ref 4.1–5.3)
RBC UR QL AUTO: (no result)
SP GR UR STRIP.AUTO: 1.01 (ref 1–1.03)
UROBILINOGEN UR-MCNC: NORMAL MG/DL
WBC # BLD AUTO: 7.9 K/MM3 (ref 4.8–10.8)
WBC #/AREA URNS HPF: >50 /HPF (ref 0–3)

## 2021-02-21 ENCOUNTER — HOSPITAL ENCOUNTER (EMERGENCY)
Dept: HOSPITAL 6 - ED | Age: 31
Discharge: ANOTHER HOSPITAL | End: 2021-02-21
Payer: MEDICARE

## 2021-02-21 VITALS — SYSTOLIC BLOOD PRESSURE: 139 MMHG | DIASTOLIC BLOOD PRESSURE: 89 MMHG

## 2021-02-21 DIAGNOSIS — T43.592A: Primary | ICD-10-CM

## 2021-02-21 DIAGNOSIS — Z20.822: ICD-10-CM

## 2021-02-21 DIAGNOSIS — T42.8X2A: ICD-10-CM

## 2021-02-21 DIAGNOSIS — T40.2X2A: ICD-10-CM

## 2021-02-21 DIAGNOSIS — T42.4X2A: ICD-10-CM

## 2021-02-21 DIAGNOSIS — F43.23: ICD-10-CM

## 2021-02-21 LAB
ALBUMIN SERPL-MCNC: 4.3 G/DL (ref 3.5–5)
ALT SERPL-CCNC: 14 U/L (ref 0–55)
APAP SERPL-MCNC: < 1 UG/ML
APPEARANCE UR: (no result)
AST SERPL-CCNC: 21 U/L (ref 5–34)
BASOPHILS # BLD: 0 10*3/UL (ref 0.02–0.1)
BILIRUB SERPL-MCNC: 0.2 MG/DL (ref 0.2–1.2)
BILIRUB UR QL STRIP.AUTO: NEGATIVE
CALCIUM SERPL-MCNC: 9.7 MG/DL (ref 8.3–10.5)
CO2 SERPL-SCNC: 24 MMOL/L (ref 22–29)
COLOR UR AUTO: (no result)
EOSINOPHIL # BLD: 0.1 10*3/UL (ref 0.04–0.4)
EOSINOPHIL NFR BLD: 1.5 % (ref 1–5)
ERYTHROCYTE [DISTWIDTH] IN BLOOD BY AUTOMATED COUNT: 14.4 % (ref 11.5–14.5)
ETHANOL SERPL-MCNC: < 10 MG/DL (ref ?–10)
GLUCOSE SERPL-MCNC: 88 MG/DL (ref 65–105)
GLUCOSE UR QL STRIP.AUTO: NEGATIVE MG/DL
HCT VFR BLD AUTO: 40.6 % (ref 37–47)
HGB BLD-MCNC: 13.1 G/DL (ref 12.5–16)
KETONES UR QL STRIP.AUTO: NEGATIVE
LEUKOCYTE ESTERASE UR QL STRIP: (no result)
LYMPHOCYTES # BLD: 1.8 10*3/UL (ref 1.5–4)
MCH RBC QN AUTO: 31 PG (ref 27–31)
MCHC RBC AUTO-ENTMCNC: 32 G/DL (ref 33–37)
MCV RBC AUTO: 95 FL (ref 78–100)
MONOCYTES # BLD: 0.9 10*3/UL (ref 0.2–0.8)
NEUTROPHILS # BLD: 5.5 10*3/UL (ref 1.4–6.5)
NITRITE UR QL STRIP: NEGATIVE
PH UR STRIP.AUTO: 6.5 [PH] (ref 5–8)
PLATELET # BLD AUTO: 334 K/MM3 (ref 130–400)
PMV BLD AUTO: 10.4 FL (ref 7.4–10.4)
POTASSIUM SERPL-SCNC: 3.7 MMOL/L (ref 3.5–5.1)
PROT ?TM UR-MCNC: NEGATIVE MG/DL
PROT SERPL-MCNC: 7.6 G/DL (ref 6.4–8.3)
RBC # BLD AUTO: 4.26 M/MM3 (ref 4.1–5.3)
RBC UR QL AUTO: (no result)
SODIUM SERPL-SCNC: 142 MMOL/L (ref 136–145)
SP GR UR STRIP.AUTO: 1 (ref 1–1.03)
UROBILINOGEN UR-MCNC: NORMAL MG/DL
WBC # BLD AUTO: 8.4 K/MM3 (ref 4.8–10.8)
WBC #/AREA URNS HPF: (no result) /HPF (ref 0–3)

## 2021-02-28 ENCOUNTER — HOSPITAL ENCOUNTER (EMERGENCY)
Dept: HOSPITAL 6 - ED | Age: 31
Discharge: HOME | End: 2021-02-28
Payer: MEDICARE

## 2021-02-28 VITALS — SYSTOLIC BLOOD PRESSURE: 111 MMHG | DIASTOLIC BLOOD PRESSURE: 80 MMHG

## 2021-02-28 DIAGNOSIS — R13.10: ICD-10-CM

## 2021-02-28 DIAGNOSIS — R05: Primary | ICD-10-CM

## 2021-02-28 DIAGNOSIS — Z88.8: ICD-10-CM

## 2021-02-28 DIAGNOSIS — Z88.1: ICD-10-CM

## 2021-02-28 DIAGNOSIS — Z88.2: ICD-10-CM

## 2021-02-28 DIAGNOSIS — Z88.6: ICD-10-CM

## 2021-02-28 LAB
BASOPHILS # BLD: 0 10*3/UL (ref 0.02–0.1)
EOSINOPHIL # BLD: 0.4 10*3/UL (ref 0.04–0.4)
EOSINOPHIL NFR BLD: 3.4 % (ref 1–5)
ERYTHROCYTE [DISTWIDTH] IN BLOOD BY AUTOMATED COUNT: 12.9 % (ref 11.5–14.5)
HCT VFR BLD AUTO: 44.5 % (ref 37–47)
HGB BLD-MCNC: 15.2 G/DL (ref 12.5–16)
LYMPHOCYTES # BLD: 3.8 10*3/UL (ref 1.5–4)
MCH RBC QN AUTO: 31 PG (ref 27–31)
MCHC RBC AUTO-ENTMCNC: 34 G/DL (ref 33–37)
MCV RBC AUTO: 91 FL (ref 78–100)
MONOCYTES # BLD: 1.1 10*3/UL (ref 0.2–0.8)
NEUTROPHILS # BLD: 6.5 10*3/UL (ref 1.4–6.5)
PLATELET # BLD AUTO: 269 K/MM3 (ref 130–400)
PMV BLD AUTO: 10.4 FL (ref 7.4–10.4)
RBC # BLD AUTO: 4.89 M/MM3 (ref 4.1–5.3)
WBC # BLD AUTO: 11.8 K/MM3 (ref 4.8–10.8)

## 2021-03-03 ENCOUNTER — HOSPITAL ENCOUNTER (EMERGENCY)
Dept: HOSPITAL 6 - ED | Age: 31
Discharge: HOME | End: 2021-03-03
Payer: MEDICARE

## 2021-03-03 VITALS — SYSTOLIC BLOOD PRESSURE: 123 MMHG | DIASTOLIC BLOOD PRESSURE: 82 MMHG

## 2021-03-03 DIAGNOSIS — F41.9: ICD-10-CM

## 2021-03-03 DIAGNOSIS — Z88.8: ICD-10-CM

## 2021-03-03 DIAGNOSIS — F17.210: ICD-10-CM

## 2021-03-03 DIAGNOSIS — F32.9: ICD-10-CM

## 2021-03-03 DIAGNOSIS — Z88.6: ICD-10-CM

## 2021-03-03 DIAGNOSIS — R05: ICD-10-CM

## 2021-03-03 DIAGNOSIS — Z88.2: ICD-10-CM

## 2021-03-03 DIAGNOSIS — Z88.1: ICD-10-CM

## 2021-03-03 DIAGNOSIS — B37.9: Primary | ICD-10-CM

## 2021-03-03 DIAGNOSIS — F43.10: ICD-10-CM

## 2021-03-03 DIAGNOSIS — E86.0: ICD-10-CM

## 2021-03-03 LAB
ALBUMIN SERPL-MCNC: 4 G/DL (ref 3.5–5)
ALT SERPL-CCNC: 19 U/L (ref 0–55)
AST SERPL-CCNC: 18 U/L (ref 5–34)
BASOPHILS # BLD: 0.1 10*3/UL (ref 0.02–0.1)
BILIRUB SERPL-MCNC: 0.4 MG/DL (ref 0.2–1.2)
CALCIUM SERPL-MCNC: 9.1 MG/DL (ref 8.3–10.5)
CO2 SERPL-SCNC: 26 MMOL/L (ref 22–29)
EOSINOPHIL # BLD: 0.2 10*3/UL (ref 0.04–0.4)
EOSINOPHIL NFR BLD: 2.3 % (ref 1–5)
ERYTHROCYTE [DISTWIDTH] IN BLOOD BY AUTOMATED COUNT: 13.2 % (ref 11.5–14.5)
GLUCOSE SERPL-MCNC: 91 MG/DL (ref 65–105)
HCT VFR BLD AUTO: 41.2 % (ref 37–47)
HGB BLD-MCNC: 13.8 G/DL (ref 12.5–16)
LYMPHOCYTES # BLD: 3.2 10*3/UL (ref 1.5–4)
MCH RBC QN AUTO: 31 PG (ref 27–31)
MCHC RBC AUTO-ENTMCNC: 34 G/DL (ref 33–37)
MCV RBC AUTO: 93 FL (ref 78–100)
MONOCYTES # BLD: 0.7 10*3/UL (ref 0.2–0.8)
NEUTROPHILS # BLD: 5 10*3/UL (ref 1.4–6.5)
PLATELET # BLD AUTO: 347 K/MM3 (ref 130–400)
PMV BLD AUTO: 9.6 FL (ref 7.4–10.4)
POTASSIUM SERPL-SCNC: 3.6 MMOL/L (ref 3.5–5.1)
PROT SERPL-MCNC: 7.2 G/DL (ref 6.4–8.3)
RBC # BLD AUTO: 4.43 M/MM3 (ref 4.1–5.3)
SODIUM SERPL-SCNC: 138 MMOL/L (ref 136–145)
WBC # BLD AUTO: 9.1 K/MM3 (ref 4.8–10.8)

## 2021-08-09 ENCOUNTER — HOSPITAL ENCOUNTER (EMERGENCY)
Dept: HOSPITAL 6 - ED | Age: 31
Discharge: HOME | End: 2021-08-09
Payer: MEDICARE

## 2021-08-09 VITALS — SYSTOLIC BLOOD PRESSURE: 134 MMHG | DIASTOLIC BLOOD PRESSURE: 74 MMHG

## 2021-08-09 DIAGNOSIS — J45.909: ICD-10-CM

## 2021-08-09 DIAGNOSIS — F41.9: ICD-10-CM

## 2021-08-09 DIAGNOSIS — M54.9: Primary | ICD-10-CM

## 2021-08-09 DIAGNOSIS — M25.511: ICD-10-CM

## 2021-08-09 DIAGNOSIS — F17.200: ICD-10-CM

## 2022-01-23 ENCOUNTER — HOSPITAL ENCOUNTER (EMERGENCY)
Dept: HOSPITAL 19 - COL.ER | Age: 32
LOS: 1 days | Discharge: HOME | End: 2022-01-24
Attending: EMERGENCY MEDICINE
Payer: MEDICARE

## 2022-01-23 VITALS — BODY MASS INDEX: 37.3 KG/M2 | HEIGHT: 64.02 IN | WEIGHT: 218.48 LBS

## 2022-01-23 VITALS — TEMPERATURE: 98.3 F

## 2022-01-23 DIAGNOSIS — Z32.02: ICD-10-CM

## 2022-01-23 DIAGNOSIS — Z90.49: ICD-10-CM

## 2022-01-23 DIAGNOSIS — R10.9: ICD-10-CM

## 2022-01-23 DIAGNOSIS — R50.9: Primary | ICD-10-CM

## 2022-01-23 LAB
PH UR STRIP.AUTO: 7 [PH] (ref 5–8)
RBC # UR: (no result) /HPF (ref 0–2)
SP GR UR STRIP.AUTO: 1 (ref 1–1.03)
SQUAMOUS # URNS: (no result) /HPF (ref 0–10)
URN COLLECT METHOD CLASS: (no result)

## 2022-01-24 VITALS — SYSTOLIC BLOOD PRESSURE: 142 MMHG | DIASTOLIC BLOOD PRESSURE: 78 MMHG | HEART RATE: 64 BPM

## 2022-01-24 LAB
ALBUMIN SERPL-MCNC: 4.4 GM/DL (ref 3.5–5)
ALP SERPL-CCNC: 73 U/L (ref 40–150)
ALT SERPL-CCNC: 12 U/L (ref 0–55)
ANION GAP SERPL CALC-SCNC: 13 MMOL/L (ref 7–16)
AST SERPL-CCNC: 30 U/L (ref 5–34)
BASOPHILS # BLD: 0.1 K/MM3 (ref 0–0.2)
BASOPHILS NFR BLD AUTO: 1 % (ref 0–2)
BILIRUB SERPL-MCNC: 0.2 MG/DL (ref 0.2–1.2)
BUN SERPL-MCNC: 8 MG/DL (ref 7–19)
CALCIUM SERPL-MCNC: 9.7 MG/DL (ref 8.4–10.2)
CHLORIDE SERPL-SCNC: 106 MMOL/L (ref 98–107)
CO2 SERPL-SCNC: 18 MMOL/L (ref 22–29)
CREAT SERPL-SCNC: 0.74 MG/DL (ref 0.57–1.11)
EOSINOPHIL # BLD: 0.4 K/MM3 (ref 0–0.7)
EOSINOPHIL NFR BLD: 3.8 % (ref 0–4)
ERYTHROCYTE [DISTWIDTH] IN BLOOD BY AUTOMATED COUNT: 13.5 % (ref 11.5–14.5)
GLUCOSE SERPL-MCNC: 87 MG/DL (ref 70–99)
GRANULOCYTES # BLD AUTO: 50.6 % (ref 42.2–75.2)
HCT VFR BLD AUTO: 43 % (ref 37–47)
HGB BLD-MCNC: 14.3 G/DL (ref 12.5–16)
LYMPHOCYTES # BLD: 3.5 K/MM3 (ref 1.2–3.4)
LYMPHOCYTES NFR BLD: 36.9 % (ref 20–51)
MCH RBC QN AUTO: 30 PG (ref 27–31)
MCHC RBC AUTO-ENTMCNC: 33 G/DL (ref 33–37)
MCV RBC AUTO: 91 FL (ref 80–100)
MONOCYTES # BLD: 0.7 K/MM3 (ref 0.1–0.6)
MONOCYTES NFR BLD AUTO: 7.5 % (ref 1.7–9.3)
NEUTROPHILS # BLD: 4.8 K/MM3 (ref 1.4–6.5)
PLATELET # BLD AUTO: 344 K/MM3 (ref 130–400)
PMV BLD AUTO: 10.2 FL (ref 7.4–10.4)
POTASSIUM SERPL-SCNC: 5.1 MMOL/L (ref 3.5–4.5)
PROT SERPL-MCNC: 8.6 GM/DL (ref 6.2–8.1)
RBC # BLD AUTO: 4.75 M/MM3 (ref 4.1–5.3)
SODIUM SERPL-SCNC: 137 MMOL/L (ref 136–145)

## 2022-09-19 ENCOUNTER — HOSPITAL ENCOUNTER (EMERGENCY)
Dept: HOSPITAL 6 - ED | Age: 32
Discharge: LEFT BEFORE DISCHARGE | End: 2022-09-19
Payer: COMMERCIAL

## 2022-09-19 VITALS — SYSTOLIC BLOOD PRESSURE: 108 MMHG | DIASTOLIC BLOOD PRESSURE: 81 MMHG

## 2022-09-19 VITALS — WEIGHT: 182.32 LBS | BODY MASS INDEX: 31.13 KG/M2 | HEIGHT: 64.02 IN

## 2022-09-19 DIAGNOSIS — Z28.310: ICD-10-CM

## 2022-09-19 DIAGNOSIS — E87.6: Primary | ICD-10-CM

## 2022-09-19 DIAGNOSIS — F43.20: ICD-10-CM

## 2022-09-19 DIAGNOSIS — F32.A: ICD-10-CM

## 2022-09-19 DIAGNOSIS — Z88.8: ICD-10-CM

## 2022-09-19 LAB
ALBUMIN SERPL-MCNC: 4 G/DL (ref 3.5–5)
ALT SERPL-CCNC: 7 U/L (ref 0–55)
AST SERPL-CCNC: 13 U/L (ref 5–34)
BASOPHILS # BLD: 0.08 K/MM3 (ref 0.02–0.1)
BILIRUB SERPL-MCNC: 0.3 MG/DL (ref 0.2–1.2)
CALCIUM SERPL-MCNC: 9.1 MG/DL (ref 8.3–10.5)
CO2 SERPL-SCNC: 27 MMOL/L (ref 22–29)
EOSINOPHIL # BLD: 0.2 K/MM3 (ref 0.04–0.4)
EOSINOPHIL NFR BLD: 2.6 % (ref 1–5)
ERYTHROCYTE [DISTWIDTH] IN BLOOD BY AUTOMATED COUNT: 13.1 % (ref 11.5–14.5)
GLUCOSE SERPL-MCNC: 98 MG/DL (ref 65–105)
HCT VFR BLD AUTO: 41.6 % (ref 37–47)
HGB BLD-MCNC: 14.1 G/DL (ref 12.5–16)
LIPASE SERPL-CCNC: 8 U/L (ref 8–78)
LYMPHOCYTES # BLD: 3.13 K/MM3 (ref 1.5–4)
MCH RBC QN AUTO: 31 PG (ref 27–31)
MCHC RBC AUTO-ENTMCNC: 34 G/DL (ref 33–37)
MCV RBC AUTO: 90 FL (ref 78–100)
MONOCYTES # BLD: 0.68 K/MM3 (ref 0.2–0.8)
NEUTROPHILS # BLD: 3.51 K/MM3 (ref 1.4–6.5)
PLATELET # BLD AUTO: 241 K/MM3 (ref 130–400)
PMV BLD AUTO: 10.8 FL (ref 7.4–10.4)
POTASSIUM SERPL-SCNC: 2.8 MMOL/L (ref 3.5–5.1)
PROT SERPL-MCNC: 6.8 G/DL (ref 6.4–8.3)
RBC # BLD AUTO: 4.6 M/MM3 (ref 4.1–5.3)
SODIUM SERPL-SCNC: 140 MMOL/L (ref 136–145)
WBC # BLD AUTO: 7.6 K/MM3 (ref 4.8–10.8)

## 2022-09-20 ENCOUNTER — HOSPITAL ENCOUNTER (EMERGENCY)
Dept: HOSPITAL 6 - ED | Age: 32
LOS: 1 days | Discharge: HOME | End: 2022-09-21
Payer: COMMERCIAL

## 2022-09-20 DIAGNOSIS — F32.A: Primary | ICD-10-CM

## 2022-09-20 DIAGNOSIS — F17.200: ICD-10-CM

## 2022-09-20 DIAGNOSIS — Z28.310: ICD-10-CM

## 2022-09-20 DIAGNOSIS — R11.2: ICD-10-CM

## 2022-09-20 DIAGNOSIS — R77.8: ICD-10-CM

## 2022-09-20 DIAGNOSIS — E87.6: ICD-10-CM

## 2022-09-20 LAB
ALBUMIN SERPL-MCNC: 4.1 G/DL (ref 3.5–5)
ALT SERPL-CCNC: 8 U/L (ref 0–55)
AST SERPL-CCNC: 13 U/L (ref 5–34)
BILIRUB SERPL-MCNC: 0.2 MG/DL (ref 0.2–1.2)
CALCIUM SERPL-MCNC: 9.6 MG/DL (ref 8.3–10.5)
CO2 SERPL-SCNC: 27 MMOL/L (ref 22–29)
GLUCOSE SERPL-MCNC: 95 MG/DL (ref 65–105)
POTASSIUM SERPL-SCNC: 3.4 MMOL/L (ref 3.5–5.1)
PROT SERPL-MCNC: 7.1 G/DL (ref 6.4–8.3)
PROTHROMBIN TIME: 11.6 SECONDS (ref 9–12)
SODIUM SERPL-SCNC: 141 MMOL/L (ref 136–145)

## 2022-09-21 VITALS — DIASTOLIC BLOOD PRESSURE: 85 MMHG | SYSTOLIC BLOOD PRESSURE: 104 MMHG

## 2022-09-21 LAB
APPEARANCE UR: CLEAR
BILIRUB UR QL STRIP.AUTO: NEGATIVE
COLOR UR AUTO: (no result)
D DIMER PPP FEU-MCNC: 0.09 MG/L FEU (ref 0.15–0.5)
GLUCOSE UR QL STRIP.AUTO: (no result)
KETONES UR QL STRIP.AUTO: NEGATIVE
LEUKOCYTE ESTERASE UR QL STRIP: NEGATIVE
NITRITE UR QL STRIP: NEGATIVE
PH UR STRIP.AUTO: 6 [PH] (ref 5–8)
PROT ?TM UR-MCNC: (no result) MG/DL
RBC UR QL AUTO: NEGATIVE
SP GR UR STRIP.AUTO: 1 (ref 1–1.03)
UROBILINOGEN UR-MCNC: NORMAL MG/DL
WBC #/AREA URNS HPF: (no result) /HPF (ref 0–3)

## 2024-09-26 ENCOUNTER — HOSPITAL ENCOUNTER (EMERGENCY)
Dept: HOSPITAL 6 - ED | Age: 34
Discharge: HOME | End: 2024-09-26
Payer: MEDICARE

## 2024-09-26 VITALS — DIASTOLIC BLOOD PRESSURE: 90 MMHG | SYSTOLIC BLOOD PRESSURE: 136 MMHG

## 2024-09-26 VITALS — HEIGHT: 55 IN | WEIGHT: 182.5 LBS

## 2024-09-26 DIAGNOSIS — R42: ICD-10-CM

## 2024-09-26 DIAGNOSIS — R00.1: Primary | ICD-10-CM

## 2024-09-26 LAB
ALBUMIN SERPL-MCNC: 4 G/DL (ref 3.5–5)
ALT SERPL-CCNC: 10 U/L (ref 0–55)
AST SERPL-CCNC: 15 U/L (ref 5–34)
BASOPHILS # BLD: 0.02 K/MM3 (ref 0.02–0.1)
BILIRUB SERPL-MCNC: 0.3 MG/DL (ref 0.2–1.2)
CALCIUM SERPL-MCNC: 9.1 MG/DL (ref 8.3–10.5)
CO2 SERPL-SCNC: 19 MMOL/L (ref 22–29)
EOSINOPHIL # BLD: 0.16 K/MM3 (ref 0.04–0.4)
EOSINOPHIL NFR BLD: 2.2 % (ref 1–5)
ERYTHROCYTE [DISTWIDTH] IN BLOOD BY AUTOMATED COUNT: 12.5 % (ref 11.5–14.5)
GLUCOSE SERPL-MCNC: 102 MG/DL (ref 65–105)
HCT VFR BLD AUTO: 39.8 % (ref 37–47)
HGB BLD-MCNC: 13.4 G/DL (ref 12.5–16)
LYMPHOCYTES # BLD: 2.23 K/MM3 (ref 1.5–4)
MCH RBC QN AUTO: 31 PG (ref 27–31)
MCHC RBC AUTO-ENTMCNC: 34 G/DL (ref 33–37)
MCV RBC AUTO: 92 FL (ref 78–100)
MONOCYTES # BLD: 0.61 K/MM3 (ref 0.2–0.8)
NEUTROPHILS # BLD: 4.25 K/MM3 (ref 1.4–6.5)
PLATELET # BLD AUTO: 269 K/MM3 (ref 130–400)
PMV BLD AUTO: 10.5 FL (ref 7.4–10.4)
PROT SERPL-MCNC: 6.9 G/DL (ref 6.4–8.3)
RBC # BLD AUTO: 4.33 M/MM3 (ref 4.1–5.3)
SODIUM SERPL-SCNC: 138 MMOL/L (ref 136–145)
TROPONIN I SERPL-MCNC: < 0.03 NG/ML (ref 0–0.03)
WBC # BLD AUTO: 7.3 K/MM3 (ref 4.8–10.8)

## 2024-11-14 ENCOUNTER — HOSPITAL ENCOUNTER (EMERGENCY)
Dept: HOSPITAL 6 - ED | Age: 34
End: 2024-11-14
Payer: MEDICARE

## 2024-11-14 DIAGNOSIS — I49.5: Primary | ICD-10-CM

## 2024-11-14 DIAGNOSIS — F41.9: ICD-10-CM

## 2024-11-14 LAB
ALBUMIN SERPL-MCNC: 4 G/DL (ref 3.5–5)
ALT SERPL-CCNC: 10 U/L (ref 0–55)
AST SERPL-CCNC: 15 U/L (ref 5–34)
BASOPHILS # BLD: 0.04 K/MM3 (ref 0.02–0.1)
BILIRUB SERPL-MCNC: 0.5 MG/DL (ref 0.2–1.2)
CALCIUM SERPL-MCNC: 8.8 MG/DL (ref 8.3–10.5)
CO2 SERPL-SCNC: 23 MMOL/L (ref 22–29)
EOSINOPHIL # BLD: 0.25 K/MM3 (ref 0.04–0.4)
EOSINOPHIL NFR BLD: 3.7 % (ref 1–5)
ERYTHROCYTE [DISTWIDTH] IN BLOOD BY AUTOMATED COUNT: 12.6 % (ref 11.5–14.5)
GLUCOSE SERPL-MCNC: 89 MG/DL (ref 65–105)
HCT VFR BLD AUTO: 38.2 % (ref 37–47)
HGB BLD-MCNC: 12.7 G/DL (ref 12.5–16)
LYMPHOCYTES # BLD: 2.28 K/MM3 (ref 1.5–4)
MCH RBC QN AUTO: 31 PG (ref 27–31)
MCHC RBC AUTO-ENTMCNC: 33 G/DL (ref 33–37)
MCV RBC AUTO: 94 FL (ref 78–100)
MONOCYTES # BLD: 0.51 K/MM3 (ref 0.2–0.8)
NEUTROPHILS # BLD: 3.66 K/MM3 (ref 1.4–6.5)
PLATELET # BLD AUTO: 264 K/MM3 (ref 130–400)
PMV BLD AUTO: 10.2 FL (ref 7.4–10.4)
PROT SERPL-MCNC: 6.9 G/DL (ref 6.4–8.3)
RBC # BLD AUTO: 4.06 M/MM3 (ref 4.1–5.3)
SODIUM SERPL-SCNC: 139 MMOL/L (ref 136–145)
TROPONIN I SERPL-MCNC: < 0.03 NG/ML (ref 0–0.03)
WBC # BLD AUTO: 6.8 K/MM3 (ref 4.8–10.8)